# Patient Record
Sex: FEMALE | Race: WHITE | Employment: OTHER | URBAN - METROPOLITAN AREA
[De-identification: names, ages, dates, MRNs, and addresses within clinical notes are randomized per-mention and may not be internally consistent; named-entity substitution may affect disease eponyms.]

---

## 2017-01-18 ENCOUNTER — PATIENT MESSAGE (OUTPATIENT)
Dept: OBGYN CLINIC | Facility: CLINIC | Age: 32
End: 2017-01-18

## 2017-01-19 NOTE — TELEPHONE ENCOUNTER
From: Padma Suarez  To: Amirah Pineda MD  Sent: 1/18/2017 8:53 PM CST  Subject: Non-Urgent Medical Question    Hi, I had a question about getting pregnant. I was in last year in Feb due to miscarrying.  In May, i found out I had gallstones and my

## 2017-01-19 NOTE — TELEPHONE ENCOUNTER
No further recommendations  But with the gi issues women experience in pregnancy--I would get the current situation under control with the gastroenterologist.

## 2017-01-25 ENCOUNTER — LAB ENCOUNTER (OUTPATIENT)
Dept: LAB | Age: 32
End: 2017-01-25
Attending: INTERNAL MEDICINE
Payer: COMMERCIAL

## 2017-01-25 DIAGNOSIS — R17 JAUNDICE: ICD-10-CM

## 2017-01-25 LAB
ALBUMIN SERPL-MCNC: 4.3 G/DL (ref 3.5–4.8)
ALP LIVER SERPL-CCNC: 43 U/L (ref 37–98)
ALT SERPL-CCNC: 17 U/L (ref 14–54)
AST SERPL-CCNC: 13 U/L (ref 15–41)
BASOPHILS # BLD AUTO: 0.03 X10(3) UL (ref 0–0.1)
BASOPHILS NFR BLD AUTO: 0.5 %
BILIRUB DIRECT SERPL-MCNC: 0.4 MG/DL (ref 0.1–0.5)
BILIRUB SERPL-MCNC: 11.1 MG/DL (ref 0.1–2)
BUN BLD-MCNC: 12 MG/DL (ref 8–20)
CALCIUM BLD-MCNC: 8.8 MG/DL (ref 8.3–10.3)
CHLORIDE: 108 MMOL/L (ref 101–111)
CO2: 28 MMOL/L (ref 22–32)
CREAT BLD-MCNC: 0.55 MG/DL (ref 0.55–1.02)
EOSINOPHIL # BLD AUTO: 0.07 X10(3) UL (ref 0–0.3)
EOSINOPHIL NFR BLD AUTO: 1.2 %
ERYTHROCYTE [DISTWIDTH] IN BLOOD BY AUTOMATED COUNT: 19.5 % (ref 11.5–16)
GLUCOSE BLD-MCNC: 116 MG/DL (ref 70–99)
HCT VFR BLD AUTO: 29.4 % (ref 34–50)
HGB BLD-MCNC: 10.2 G/DL (ref 12–16)
IMMATURE GRANULOCYTE COUNT: 0.04 X10(3) UL (ref 0–1)
IMMATURE GRANULOCYTE RATIO %: 0.7 %
LYMPHOCYTES # BLD AUTO: 1.62 X10(3) UL (ref 0.9–4)
LYMPHOCYTES NFR BLD AUTO: 28.8 %
M PROTEIN MFR SERPL ELPH: 6.9 G/DL (ref 6.1–8.3)
MCH RBC QN AUTO: 30.4 PG (ref 27–33.2)
MCHC RBC AUTO-ENTMCNC: 34.7 G/DL (ref 31–37)
MCV RBC AUTO: 87.5 FL (ref 81–100)
MONOCYTES # BLD AUTO: 0.21 X10(3) UL (ref 0.1–0.6)
MONOCYTES NFR BLD AUTO: 3.7 %
NEUTROPHIL ABS PRELIM: 3.66 X10 (3) UL (ref 1.3–6.7)
NEUTROPHILS # BLD AUTO: 3.66 X10(3) UL (ref 1.3–6.7)
NEUTROPHILS NFR BLD AUTO: 65.1 %
PLATELET # BLD AUTO: 188 10(3)UL (ref 150–450)
POTASSIUM SERPL-SCNC: 3.9 MMOL/L (ref 3.6–5.1)
RBC # BLD AUTO: 3.36 X10(6)UL (ref 3.8–5.1)
RED CELL DISTRIBUTION WIDTH-SD: 60.4 FL (ref 35.1–46.3)
SODIUM SERPL-SCNC: 143 MMOL/L (ref 136–144)
WBC # BLD AUTO: 5.6 X10(3) UL (ref 4–13)

## 2017-01-25 PROCEDURE — 80053 COMPREHEN METABOLIC PANEL: CPT

## 2017-01-25 PROCEDURE — 36415 COLL VENOUS BLD VENIPUNCTURE: CPT

## 2017-01-25 PROCEDURE — 85025 COMPLETE CBC W/AUTO DIFF WBC: CPT

## 2017-01-25 PROCEDURE — 82248 BILIRUBIN DIRECT: CPT

## 2017-02-21 ENCOUNTER — OFFICE VISIT (OUTPATIENT)
Dept: OBGYN CLINIC | Facility: CLINIC | Age: 32
End: 2017-02-21

## 2017-02-21 ENCOUNTER — APPOINTMENT (OUTPATIENT)
Dept: LAB | Age: 32
End: 2017-02-21
Attending: FAMILY MEDICINE
Payer: COMMERCIAL

## 2017-02-21 VITALS
HEART RATE: 83 BPM | BODY MASS INDEX: 21.19 KG/M2 | WEIGHT: 135 LBS | SYSTOLIC BLOOD PRESSURE: 108 MMHG | DIASTOLIC BLOOD PRESSURE: 68 MMHG | HEIGHT: 67 IN

## 2017-02-21 DIAGNOSIS — Z34.90 EARLY STAGE OF PREGNANCY: ICD-10-CM

## 2017-02-21 DIAGNOSIS — Z87.59 HISTORY OF MISCARRIAGE: ICD-10-CM

## 2017-02-21 DIAGNOSIS — Z12.39 BREAST CANCER SCREENING: ICD-10-CM

## 2017-02-21 DIAGNOSIS — Z01.419 ENCOUNTER FOR GYNECOLOGICAL EXAMINATION WITHOUT ABNORMAL FINDING: Primary | ICD-10-CM

## 2017-02-21 DIAGNOSIS — Z12.4 CERVICAL CANCER SCREENING: ICD-10-CM

## 2017-02-21 LAB
HCG QUANTITATIVE: 335 MIU/ML (ref ?–1)
PROGESTERONE: 9.67 NG/ML

## 2017-02-21 PROCEDURE — 99395 PREV VISIT EST AGE 18-39: CPT | Performed by: OBSTETRICS & GYNECOLOGY

## 2017-02-21 PROCEDURE — 84702 CHORIONIC GONADOTROPIN TEST: CPT

## 2017-02-21 PROCEDURE — 36415 COLL VENOUS BLD VENIPUNCTURE: CPT

## 2017-02-21 PROCEDURE — 88175 CYTOPATH C/V AUTO FLUID REDO: CPT | Performed by: OBSTETRICS & GYNECOLOGY

## 2017-02-21 PROCEDURE — 84144 ASSAY OF PROGESTERONE: CPT

## 2017-02-21 PROCEDURE — 87624 HPV HI-RISK TYP POOLED RSLT: CPT | Performed by: OBSTETRICS & GYNECOLOGY

## 2017-02-21 NOTE — PROGRESS NOTES
GYN H&P     2017  11:52 AM    CC: Patient presents with:  Physical: Annual, patient states had + home upt on 17. HPI: patient is a 29year old  here for her annual gyne exam.   She has no complaints.  Pt had + pregnancy test on  Caffeine Concern No    Exercise No    Seat Belt Yes     Social History Narrative    ** Merged History Encounter **           ROS:     Review of Systems:   Constitutional: Negative for fever, chills and fatigue   HENT: Negative congestion, sore throat   Eye edema        A/P: Patient is 29year old female with no complaints. Here for well woman exam.       1. Routine gyne exam  -discussed diet and exercise  -pregnancy: h/o recent miscarriage, will go for hcg and repeat in 48 hrs  2.  Cervical cancer screening

## 2017-02-22 NOTE — PROGRESS NOTES
Quick Note:    Patient notified of labs. Recommend patient return for repeat Quantitative HCG tomorrow. Dr. Raciel Turner would like patient to use Prometrium 200mg 1 pill vaginally Q HS - RX #30 with 2 refills.  SAB precautions given.  ______

## 2017-02-23 ENCOUNTER — TELEPHONE (OUTPATIENT)
Dept: OBGYN CLINIC | Facility: CLINIC | Age: 32
End: 2017-02-23

## 2017-02-23 ENCOUNTER — APPOINTMENT (OUTPATIENT)
Dept: LAB | Age: 32
End: 2017-02-23
Attending: OBSTETRICS & GYNECOLOGY
Payer: COMMERCIAL

## 2017-02-23 DIAGNOSIS — Z32.01 PREGNANCY EXAMINATION OR TEST, POSITIVE RESULT: ICD-10-CM

## 2017-02-23 DIAGNOSIS — O20.0 THREATENED MISCARRIAGE: Primary | ICD-10-CM

## 2017-02-23 DIAGNOSIS — Z32.01 PREGNANCY EXAMINATION OR TEST, POSITIVE RESULT: Primary | ICD-10-CM

## 2017-02-23 LAB
HCG QUANTITATIVE: 739 MIU/ML (ref ?–1)
HPV I/H RISK 1 DNA SPEC QL NAA+PROBE: NEGATIVE

## 2017-02-23 PROCEDURE — 36415 COLL VENOUS BLD VENIPUNCTURE: CPT

## 2017-02-23 PROCEDURE — 84702 CHORIONIC GONADOTROPIN TEST: CPT

## 2017-02-23 NOTE — TELEPHONE ENCOUNTER
Pt c/o scant red spotting last night and one episode today. Occasional mild cramping. Pt presently asymptomatic. LMP: 1/23/16 2/21 HCG/P4 335/9.6  Rpt HCG today- pending. Disc. Pelvic rest.   Pt to call if increase bleeding or LAP.    Pt denies any

## 2017-02-23 NOTE — TELEPHONE ENCOUNTER
Pt notified. Verbalized understanding. HCG/P4 ordered. Bleeding precautions disc. Call if any questions or concerns.

## 2017-02-23 NOTE — TELEPHONE ENCOUNTER
Pt called stating she just started Progesterone  She started bleeding lightly last night  Is this normal

## 2017-02-24 NOTE — PROGRESS NOTES
Quick Note:    Patient informed. Verbalized understanding. No further questions or concerns.   ______

## 2017-02-27 ENCOUNTER — APPOINTMENT (OUTPATIENT)
Dept: LAB | Age: 32
End: 2017-02-27
Attending: OBSTETRICS & GYNECOLOGY
Payer: COMMERCIAL

## 2017-02-27 DIAGNOSIS — O20.0 THREATENED MISCARRIAGE: ICD-10-CM

## 2017-02-27 LAB
HCG QUANTITATIVE: 4221 MIU/ML (ref ?–1)
PROGESTERONE: 18.87 NG/ML

## 2017-02-27 PROCEDURE — 84702 CHORIONIC GONADOTROPIN TEST: CPT

## 2017-02-27 PROCEDURE — 36415 COLL VENOUS BLD VENIPUNCTURE: CPT

## 2017-02-27 PROCEDURE — 84144 ASSAY OF PROGESTERONE: CPT

## 2017-03-16 ENCOUNTER — OFFICE VISIT (OUTPATIENT)
Dept: OBGYN CLINIC | Facility: CLINIC | Age: 32
End: 2017-03-16

## 2017-03-16 VITALS
HEIGHT: 67 IN | WEIGHT: 134 LBS | DIASTOLIC BLOOD PRESSURE: 60 MMHG | SYSTOLIC BLOOD PRESSURE: 102 MMHG | BODY MASS INDEX: 21.03 KG/M2

## 2017-03-16 DIAGNOSIS — Z11.3 SCREENING FOR STD (SEXUALLY TRANSMITTED DISEASE): ICD-10-CM

## 2017-03-16 DIAGNOSIS — Z34.81 PRENATAL CARE, SUBSEQUENT PREGNANCY IN FIRST TRIMESTER: Primary | ICD-10-CM

## 2017-03-16 DIAGNOSIS — R11.0 NAUSEA: ICD-10-CM

## 2017-03-16 DIAGNOSIS — D58.0 HEREDITARY SPHEROCYTOSIS (HCC): ICD-10-CM

## 2017-03-16 PROCEDURE — 87491 CHLMYD TRACH DNA AMP PROBE: CPT | Performed by: OBSTETRICS & GYNECOLOGY

## 2017-03-16 PROCEDURE — 87086 URINE CULTURE/COLONY COUNT: CPT | Performed by: OBSTETRICS & GYNECOLOGY

## 2017-03-16 PROCEDURE — 87591 N.GONORRHOEAE DNA AMP PROB: CPT | Performed by: OBSTETRICS & GYNECOLOGY

## 2017-03-16 RX ORDER — PRENATAL VIT/IRON FUM/FOLIC AC 27MG-0.8MG
1 TABLET ORAL DAILY
COMMUNITY
End: 2018-01-09

## 2017-03-16 RX ORDER — DOXYLAMINE SUCCINATE AND PYRIDOXINE HYDROCHLORIDE, DELAYED RELEASE TABLETS 10 MG/10 MG 10; 10 MG/1; MG/1
2 TABLET, DELAYED RELEASE ORAL NIGHTLY
Qty: 30 TABLET | Refills: 0 | Status: SHIPPED | OUTPATIENT
Start: 2017-03-16 | End: 2017-03-28

## 2017-03-16 NOTE — PROGRESS NOTES
Here for initial prenatal visit with our group. 29year old  at 7w3d by LMP c/w todays US. H/o miscarriage last yr, borderline low progesterone this pregnancy on vaginal progesterone supplementation until 13 wks.   OB Hx: h/o , IOL at 38 wks for

## 2017-03-17 LAB
C TRACH DNA SPEC QL NAA+PROBE: POSITIVE
N GONORRHOEA DNA SPEC QL NAA+PROBE: NEGATIVE

## 2017-03-17 RX ORDER — AZITHROMYCIN 500 MG/1
TABLET, FILM COATED ORAL
Qty: 2 TABLET | Refills: 1 | Status: SHIPPED | OUTPATIENT
Start: 2017-03-17 | End: 2017-03-21

## 2017-03-17 NOTE — PROGRESS NOTES
Quick Note:    Patient informed of results. Verbalized understanding. RX sent to pharmacy. No further questions or concerns.   ______

## 2017-03-20 ENCOUNTER — LAB ENCOUNTER (OUTPATIENT)
Dept: LAB | Age: 32
End: 2017-03-20
Attending: OBSTETRICS & GYNECOLOGY
Payer: COMMERCIAL

## 2017-03-20 DIAGNOSIS — Z34.81 PRENATAL CARE, SUBSEQUENT PREGNANCY IN FIRST TRIMESTER: ICD-10-CM

## 2017-03-20 LAB
ANTIBODY SCREEN: NEGATIVE
BASOPHILS # BLD AUTO: 0.03 X10(3) UL (ref 0–0.1)
BASOPHILS NFR BLD AUTO: 0.4 %
EOSINOPHIL # BLD AUTO: 0.05 X10(3) UL (ref 0–0.3)
EOSINOPHIL NFR BLD AUTO: 0.7 %
ERYTHROCYTE [DISTWIDTH] IN BLOOD BY AUTOMATED COUNT: 21.2 % (ref 11.5–16)
HBV SURFACE AG SERPL QL IA: NONREACTIVE
HCT VFR BLD AUTO: 24.8 % (ref 34–50)
HGB BLD-MCNC: 8.7 G/DL (ref 12–16)
IMMATURE GRANULOCYTE COUNT: 0.08 X10(3) UL (ref 0–1)
IMMATURE GRANULOCYTE RATIO %: 1.1 %
LYMPHOCYTES # BLD AUTO: 1.36 X10(3) UL (ref 0.9–4)
LYMPHOCYTES NFR BLD AUTO: 18 %
MCH RBC QN AUTO: 29.8 PG (ref 27–33.2)
MCHC RBC AUTO-ENTMCNC: 35.1 G/DL (ref 31–37)
MCV RBC AUTO: 84.9 FL (ref 81–100)
MONOCYTES # BLD AUTO: 0.45 X10(3) UL (ref 0.1–0.6)
MONOCYTES NFR BLD AUTO: 6 %
NEUTROPHIL ABS PRELIM: 5.57 X10 (3) UL (ref 1.3–6.7)
NEUTROPHILS # BLD AUTO: 5.57 X10(3) UL (ref 1.3–6.7)
NEUTROPHILS NFR BLD AUTO: 73.8 %
PLATELET # BLD AUTO: 173 10(3)UL (ref 150–450)
RBC # BLD AUTO: 2.92 X10(6)UL (ref 3.8–5.1)
RED CELL DISTRIBUTION WIDTH-SD: 63.9 FL (ref 35.1–46.3)
RH BLOOD TYPE: POSITIVE
RUBELLA IGG QUANTITATIVE: 61.6 IU/ML
RUBV IGG SER QL: POSITIVE
T PALLIDUM AB SER QL IA: NONREACTIVE
WBC # BLD AUTO: 7.5 X10(3) UL (ref 4–13)

## 2017-03-20 PROCEDURE — 86780 TREPONEMA PALLIDUM: CPT

## 2017-03-20 PROCEDURE — 87340 HEPATITIS B SURFACE AG IA: CPT

## 2017-03-20 PROCEDURE — 86901 BLOOD TYPING SEROLOGIC RH(D): CPT

## 2017-03-20 PROCEDURE — 86762 RUBELLA ANTIBODY: CPT

## 2017-03-20 PROCEDURE — 86850 RBC ANTIBODY SCREEN: CPT

## 2017-03-20 PROCEDURE — 85025 COMPLETE CBC W/AUTO DIFF WBC: CPT

## 2017-03-20 PROCEDURE — 86900 BLOOD TYPING SEROLOGIC ABO: CPT

## 2017-03-20 PROCEDURE — 36415 COLL VENOUS BLD VENIPUNCTURE: CPT

## 2017-03-20 PROCEDURE — 87389 HIV-1 AG W/HIV-1&-2 AB AG IA: CPT

## 2017-03-20 RX ORDER — AZITHROMYCIN 500 MG/1
TABLET, FILM COATED ORAL
Qty: 2 TABLET | Refills: 1 | Status: CANCELLED | OUTPATIENT
Start: 2017-03-20 | End: 2017-03-24

## 2017-03-20 NOTE — TELEPHONE ENCOUNTER
Refilled for azithromycin sent to pharmacy when it was ordered 3/17/2017. Detailed message left notifying patient to contact the pharmacy for refill.

## 2017-03-20 NOTE — TELEPHONE ENCOUNTER
From: Zachary Ro  To: Frannie Arreguin MD  Sent: 3/20/2017 11:43 AM CDT  Subject: Medication Renewal Request    Original authorizing provider: MD Zachary Fofana would like a refill of the following medications:  azithromycin 500

## 2017-03-20 NOTE — PROGRESS NOTES
Quick Note:    Patient notified  She is going to call Dr. Gabe Tsang office today to schedule an appointment  ______

## 2017-03-28 RX ORDER — DOXYLAMINE SUCCINATE AND PYRIDOXINE HYDROCHLORIDE, DELAYED RELEASE TABLETS 10 MG/10 MG 10; 10 MG/1; MG/1
2 TABLET, DELAYED RELEASE ORAL NIGHTLY
Qty: 30 TABLET | Refills: 0 | Status: SHIPPED | OUTPATIENT
Start: 2017-03-28 | End: 2017-05-08

## 2017-03-28 NOTE — TELEPHONE ENCOUNTER
From: Gurmeet Ho  To: Tricia Martin MD  Sent: 3/28/2017 8:59 AM CDT  Subject: Medication Renewal Request    Original authorizing provider: MD Gurmeet Roy would like a refill of the following medications:  doxylamine-pyridox

## 2017-04-05 ENCOUNTER — TELEPHONE (OUTPATIENT)
Dept: OBGYN CLINIC | Facility: CLINIC | Age: 32
End: 2017-04-05

## 2017-04-10 ENCOUNTER — MED REC SCAN ONLY (OUTPATIENT)
Dept: OBGYN CLINIC | Facility: CLINIC | Age: 32
End: 2017-04-10

## 2017-04-13 ENCOUNTER — OFFICE VISIT (OUTPATIENT)
Dept: OBGYN CLINIC | Facility: CLINIC | Age: 32
End: 2017-04-13

## 2017-04-13 VITALS
WEIGHT: 138 LBS | BODY MASS INDEX: 21.66 KG/M2 | SYSTOLIC BLOOD PRESSURE: 100 MMHG | HEIGHT: 67 IN | DIASTOLIC BLOOD PRESSURE: 60 MMHG

## 2017-04-13 DIAGNOSIS — D58.0 HEREDITARY SPHEROCYTOSIS (HCC): ICD-10-CM

## 2017-04-13 DIAGNOSIS — Z34.81 PRENATAL CARE, SUBSEQUENT PREGNANCY IN FIRST TRIMESTER: Primary | ICD-10-CM

## 2017-04-13 RX ORDER — METOCLOPRAMIDE 10 MG/1
10 TABLET ORAL EVERY 8 HOURS PRN
Qty: 30 TABLET | Refills: 0 | Status: SHIPPED | OUTPATIENT
Start: 2017-04-13 | End: 2017-07-21 | Stop reason: ALTCHOICE

## 2017-04-13 RX ORDER — FOLIC ACID 1 MG/1
TABLET ORAL
Refills: 3 | COMMUNITY
Start: 2017-03-23 | End: 2018-01-09

## 2017-04-13 NOTE — PROGRESS NOTES
[facility]  Doing well  No complaints. No LOF/VB/uctx  RH pos  Genetic testing pt still considering (first, quad/AFP)  Anatomy Scan Level 2 w/ MFM (18-20weeks)  Referral to [facility] for hereditary spherocytosis.  Pt has hematologist. Watching hgb for now, mahnaz hays

## 2017-05-08 ENCOUNTER — OFFICE VISIT (OUTPATIENT)
Dept: OBGYN CLINIC | Facility: CLINIC | Age: 32
End: 2017-05-08

## 2017-05-08 VITALS
WEIGHT: 142 LBS | DIASTOLIC BLOOD PRESSURE: 56 MMHG | HEIGHT: 67 IN | BODY MASS INDEX: 22.29 KG/M2 | SYSTOLIC BLOOD PRESSURE: 98 MMHG

## 2017-05-08 DIAGNOSIS — D58.0 HEREDITARY SPHEROCYTOSIS (HCC): ICD-10-CM

## 2017-05-08 DIAGNOSIS — Z34.82 PRENATAL CARE, SUBSEQUENT PREGNANCY, SECOND TRIMESTER: ICD-10-CM

## 2017-05-08 DIAGNOSIS — Z3A.15 15 WEEKS GESTATION OF PREGNANCY: Primary | ICD-10-CM

## 2017-05-08 NOTE — PROGRESS NOTES
Has level 2 scheduled 6/12/17  Has heme appt scheduled --states he checks cbc  No complaints  Denies genetic testing  rtc 4 wks

## 2017-05-08 NOTE — PATIENT INSTRUCTIONS
Medications Safe in Pregnancy  The following over-the-counter medications may be taken safely after 12 weeks gestation by any patient who is pregnant. Please follow the instructions on the package for adult dosage.   If you experience any symptoms rachelle

## 2017-05-26 ENCOUNTER — TELEPHONE (OUTPATIENT)
Dept: OBGYN CLINIC | Facility: CLINIC | Age: 32
End: 2017-05-26

## 2017-05-26 ENCOUNTER — OFFICE VISIT (OUTPATIENT)
Dept: OBGYN CLINIC | Facility: CLINIC | Age: 32
End: 2017-05-26

## 2017-05-26 VITALS
DIASTOLIC BLOOD PRESSURE: 68 MMHG | WEIGHT: 142 LBS | HEIGHT: 67 IN | BODY MASS INDEX: 22.29 KG/M2 | SYSTOLIC BLOOD PRESSURE: 128 MMHG

## 2017-05-26 DIAGNOSIS — O26.892 PELVIC PAIN AFFECTING PREGNANCY IN SECOND TRIMESTER, ANTEPARTUM: Primary | ICD-10-CM

## 2017-05-26 DIAGNOSIS — R10.2 PELVIC PAIN AFFECTING PREGNANCY IN SECOND TRIMESTER, ANTEPARTUM: Primary | ICD-10-CM

## 2017-05-26 PROCEDURE — 87086 URINE CULTURE/COLONY COUNT: CPT | Performed by: NURSE PRACTITIONER

## 2017-05-26 PROCEDURE — 87660 TRICHOMONAS VAGIN DIR PROBE: CPT | Performed by: NURSE PRACTITIONER

## 2017-05-26 PROCEDURE — 99213 OFFICE O/P EST LOW 20 MIN: CPT | Performed by: NURSE PRACTITIONER

## 2017-05-26 PROCEDURE — 87510 GARDNER VAG DNA DIR PROBE: CPT | Performed by: NURSE PRACTITIONER

## 2017-05-26 PROCEDURE — 87480 CANDIDA DNA DIR PROBE: CPT | Performed by: NURSE PRACTITIONER

## 2017-05-26 NOTE — TELEPHONE ENCOUNTER
Patient scheduled for today at 230. Arias Clarity notified. Verbalized understanding. No further questions or concerns.

## 2017-05-26 NOTE — PROGRESS NOTES
Patient here c/o low bilateral cramping. It was intermittent yesterday but became constant later in the evening. Patient states pain is sharp and points to bilateral lower pelvis near hips.   Denies cramping or ROM  Slight increase in discharge, no odor, i

## 2017-05-26 NOTE — TELEPHONE ENCOUNTER
Patient wrote my chart message regarding sharp pains in abdomen. Per Terrie Fonseca patient needs to be seen. Attempted to contact patient but mailbox was full. Please schedule patient with Terrei Fonseca if she returns call for today.

## 2017-05-30 ENCOUNTER — TELEPHONE (OUTPATIENT)
Dept: OBGYN CLINIC | Facility: CLINIC | Age: 32
End: 2017-05-30

## 2017-05-30 RX ORDER — CLINDAMYCIN PHOSPHATE 20 MG/G
1 CREAM VAGINAL NIGHTLY
Qty: 40 G | Refills: 0 | Status: SHIPPED | OUTPATIENT
Start: 2017-05-30 | End: 2017-06-06

## 2017-05-30 NOTE — PROGRESS NOTES
Quick Note:    Please notify patient her Chlamydia is negative as well as her urine culture. She did test positive for BV which is necessary to treat in [facility]. Recommend abstinence for 1 week.  I will RX Clindamycin Vaginal Cream 1 applicator at bedtime

## 2017-06-06 ENCOUNTER — OFFICE VISIT (OUTPATIENT)
Dept: OBGYN CLINIC | Facility: CLINIC | Age: 32
End: 2017-06-06

## 2017-06-06 VITALS
SYSTOLIC BLOOD PRESSURE: 102 MMHG | BODY MASS INDEX: 22.13 KG/M2 | DIASTOLIC BLOOD PRESSURE: 60 MMHG | HEIGHT: 67 IN | WEIGHT: 141 LBS

## 2017-06-06 DIAGNOSIS — Z34.82 PRENATAL CARE, SUBSEQUENT PREGNANCY, SECOND TRIMESTER: Primary | ICD-10-CM

## 2017-06-06 NOTE — PROGRESS NOTES
Pelvic / uterine discomfort less. Just finished antibiotics yesterday. No vaginal symptoms at this time. FM for several weeks. Declines AFP / QS. Ultrasound with [facility] visit in 2 weeks.

## 2017-06-12 ENCOUNTER — OFFICE VISIT (OUTPATIENT)
Dept: PERINATAL CARE | Facility: HOSPITAL | Age: 32
End: 2017-06-12
Attending: OBSTETRICS & GYNECOLOGY
Payer: COMMERCIAL

## 2017-06-12 VITALS
HEART RATE: 86 BPM | WEIGHT: 142 LBS | SYSTOLIC BLOOD PRESSURE: 122 MMHG | DIASTOLIC BLOOD PRESSURE: 76 MMHG | BODY MASS INDEX: 22.29 KG/M2 | HEIGHT: 67 IN

## 2017-06-12 DIAGNOSIS — D58.0 HEREDITARY SPHEROCYTOSIS (HCC): Primary | ICD-10-CM

## 2017-06-12 DIAGNOSIS — O28.3 FETAL ECHOGENIC INTRACARDIAC FOCUS ON PRENATAL ULTRASOUND: ICD-10-CM

## 2017-06-12 PROCEDURE — 76811 OB US DETAILED SNGL FETUS: CPT

## 2017-06-12 PROCEDURE — 99243 OFF/OP CNSLTJ NEW/EST LOW 30: CPT

## 2017-06-12 NOTE — PROGRESS NOTES
Outpatient Maternal-Fetal Medicine Consultation    Dear Dr. Evan Doty,    Thank you for requesting ultrasound evaluation and maternal fetal medicine consultation on your patient Tony Albarado.   As you are aware she is a 29year old female with a Singleto gravid, soft, non-tender  Extremities: non-tender, no edema    HIV ANTIGEN ANTIBODY COMBO   Date Value Ref Range Status   03/20/2017 Non-Reactive Non-Reactive Final   ----------    OBSTETRIC ULTRASOUND  The patient had a level II ultrasound today which rev midline falx, cerebellum, cerebellar lobes, posterior fossa, vermis, cavum septi pellucidi. Heart: Visualized and normal appearance: four-chamber view, left outflow tract, right outflow tract, Ductal arch. Aortic arch: Normal.  Genitalia: Male fetus. aneuploidy screening or testing.     IMPRESSION:  · IUP at 20w0d  · Normal level II with isolated intracardiac echogenic focus  · Hereditary spherocytosis and anemia - followed by Dr. Logan Gustafson  · Marginal cord insertion     RECOMMENDATIONS:  · Continue care wi

## 2017-06-22 ENCOUNTER — TELEPHONE (OUTPATIENT)
Dept: OBGYN CLINIC | Facility: CLINIC | Age: 32
End: 2017-06-22

## 2017-06-22 ENCOUNTER — OFFICE VISIT (OUTPATIENT)
Dept: OBGYN CLINIC | Facility: CLINIC | Age: 32
End: 2017-06-22

## 2017-06-22 VITALS
DIASTOLIC BLOOD PRESSURE: 58 MMHG | WEIGHT: 146.81 LBS | HEIGHT: 67 IN | BODY MASS INDEX: 23.04 KG/M2 | SYSTOLIC BLOOD PRESSURE: 100 MMHG

## 2017-06-22 DIAGNOSIS — O26.892 PELVIC PAIN AFFECTING PREGNANCY IN SECOND TRIMESTER, ANTEPARTUM: Primary | ICD-10-CM

## 2017-06-22 DIAGNOSIS — R10.2 PELVIC PAIN AFFECTING PREGNANCY IN SECOND TRIMESTER, ANTEPARTUM: Primary | ICD-10-CM

## 2017-06-22 PROCEDURE — 99213 OFFICE O/P EST LOW 20 MIN: CPT | Performed by: NURSE PRACTITIONER

## 2017-06-22 PROCEDURE — 87660 TRICHOMONAS VAGIN DIR PROBE: CPT | Performed by: NURSE PRACTITIONER

## 2017-06-22 PROCEDURE — 87510 GARDNER VAG DNA DIR PROBE: CPT | Performed by: NURSE PRACTITIONER

## 2017-06-22 PROCEDURE — 87480 CANDIDA DNA DIR PROBE: CPT | Performed by: NURSE PRACTITIONER

## 2017-06-22 PROCEDURE — 87086 URINE CULTURE/COLONY COUNT: CPT | Performed by: NURSE PRACTITIONER

## 2017-06-22 NOTE — PROGRESS NOTES
Patient with complaints of pelvic cramping and pain since yesterday. Feels hydrated, denies UTI sx., constipation, bleeding, ROM, or vaginal symptoms. Was treated for BV a few weeks ago.   Vagina pink without lesions  Cervix without lesions, closed/ thick

## 2017-06-22 NOTE — TELEPHONE ENCOUNTER
Pt reports constant pelvic cramping since yesterday; pelvic pressure. Pt denies bleeding or LOF. Pt given appt today for evaluation.

## 2017-06-23 ENCOUNTER — TELEPHONE (OUTPATIENT)
Dept: OBGYN CLINIC | Facility: CLINIC | Age: 32
End: 2017-06-23

## 2017-06-23 NOTE — TELEPHONE ENCOUNTER
Patient informed of normal vaginal culture. Urine culture is still in process. Informed patient we will call back Monday w/ urine result.

## 2017-06-23 NOTE — TELEPHONE ENCOUNTER
Pt was told her test results would be in today and she could call to get them.   Pt would like a call back for results please

## 2017-06-26 NOTE — TELEPHONE ENCOUNTER
Patient informed of normal urine results. Verbalized understanding. To call w/ worsening pain, ROM, or vaginal bleeding. No further questions or concerns @ this time.

## 2017-07-03 ENCOUNTER — TELEPHONE (OUTPATIENT)
Dept: OBGYN CLINIC | Facility: CLINIC | Age: 32
End: 2017-07-03

## 2017-07-10 ENCOUNTER — MED REC SCAN ONLY (OUTPATIENT)
Dept: OBGYN CLINIC | Facility: CLINIC | Age: 32
End: 2017-07-10

## 2017-07-21 ENCOUNTER — OFFICE VISIT (OUTPATIENT)
Dept: OBGYN CLINIC | Facility: CLINIC | Age: 32
End: 2017-07-21

## 2017-07-21 VITALS
SYSTOLIC BLOOD PRESSURE: 100 MMHG | HEIGHT: 67 IN | BODY MASS INDEX: 24.17 KG/M2 | DIASTOLIC BLOOD PRESSURE: 56 MMHG | WEIGHT: 154 LBS

## 2017-07-21 DIAGNOSIS — Z34.82 PRENATAL CARE, SUBSEQUENT PREGNANCY, SECOND TRIMESTER: ICD-10-CM

## 2017-07-21 DIAGNOSIS — R31.21 ASYMPTOMATIC MICROSCOPIC HEMATURIA: ICD-10-CM

## 2017-07-21 LAB
BILIRUBIN: NEGATIVE
GLUCOSE (URINE DIPSTICK): NEGATIVE MG/DL
KETONES (URINE DIPSTICK): NEGATIVE MG/DL
MULTISTIX LOT#: NORMAL NUMERIC
NITRITE, URINE: NEGATIVE
PH, URINE: 5 (ref 4.5–8)
SPECIFIC GRAVITY: 1.02 (ref 1–1.03)
UROBILINOGEN,SEMI-QN: 0.2 MG/DL (ref 0–1.9)

## 2017-07-21 PROCEDURE — 81002 URINALYSIS NONAUTO W/O SCOPE: CPT | Performed by: OBSTETRICS & GYNECOLOGY

## 2017-07-21 PROCEDURE — 87086 URINE CULTURE/COLONY COUNT: CPT | Performed by: OBSTETRICS & GYNECOLOGY

## 2017-07-21 RX ORDER — NITROFURANTOIN 25; 75 MG/1; MG/1
100 CAPSULE ORAL 2 TIMES DAILY
Qty: 14 CAPSULE | Refills: 0 | Status: SHIPPED | OUTPATIENT
Start: 2017-07-21 | End: 2017-07-28

## 2017-07-21 NOTE — PROGRESS NOTES
No C/O, good FM  Large blood and leukocytes in urine  Discussed, treat Macrobid x 5 d, repeat urine/C &S next week  GL ordered  Has follow up scan with MFM  3 weeks

## 2017-07-27 ENCOUNTER — OFFICE VISIT (OUTPATIENT)
Dept: OBGYN CLINIC | Facility: CLINIC | Age: 32
End: 2017-07-27

## 2017-07-27 DIAGNOSIS — R82.90 ABNORMAL URINE FINDING: Primary | ICD-10-CM

## 2017-07-27 DIAGNOSIS — N39.0 URINARY TRACT INFECTION, SITE NOT SPECIFIED: ICD-10-CM

## 2017-07-27 LAB
BILIRUB UR QL STRIP.AUTO: NEGATIVE
COLOR UR AUTO: YELLOW
GLUCOSE UR STRIP.AUTO-MCNC: NEGATIVE MG/DL
KETONES UR STRIP.AUTO-MCNC: NEGATIVE MG/DL
NITRITE UR QL STRIP.AUTO: NEGATIVE
PH UR STRIP.AUTO: 6 [PH] (ref 4.5–8)
PROT UR STRIP.AUTO-MCNC: NEGATIVE MG/DL
RBC UR QL AUTO: NEGATIVE
SP GR UR STRIP.AUTO: 1.01 (ref 1–1.03)
UROBILINOGEN UR STRIP.AUTO-MCNC: 4 MG/DL

## 2017-07-27 PROCEDURE — 87086 URINE CULTURE/COLONY COUNT: CPT | Performed by: OBSTETRICS & GYNECOLOGY

## 2017-07-27 PROCEDURE — 81001 URINALYSIS AUTO W/SCOPE: CPT | Performed by: OBSTETRICS & GYNECOLOGY

## 2017-08-11 ENCOUNTER — OFFICE VISIT (OUTPATIENT)
Dept: OBGYN CLINIC | Facility: CLINIC | Age: 32
End: 2017-08-11

## 2017-08-11 ENCOUNTER — LAB ENCOUNTER (OUTPATIENT)
Dept: LAB | Age: 32
End: 2017-08-11
Attending: OBSTETRICS & GYNECOLOGY
Payer: COMMERCIAL

## 2017-08-11 VITALS
DIASTOLIC BLOOD PRESSURE: 70 MMHG | HEIGHT: 67 IN | BODY MASS INDEX: 24.48 KG/M2 | WEIGHT: 156 LBS | SYSTOLIC BLOOD PRESSURE: 102 MMHG

## 2017-08-11 DIAGNOSIS — Z34.83 PRENATAL CARE, SUBSEQUENT PREGNANCY, THIRD TRIMESTER: Primary | ICD-10-CM

## 2017-08-11 DIAGNOSIS — Z34.82 PRENATAL CARE, SUBSEQUENT PREGNANCY, SECOND TRIMESTER: ICD-10-CM

## 2017-08-11 LAB
BASOPHILS # BLD AUTO: 0.02 X10(3) UL (ref 0–0.1)
BASOPHILS NFR BLD AUTO: 0.2 %
EOSINOPHIL # BLD AUTO: 0.03 X10(3) UL (ref 0–0.3)
EOSINOPHIL NFR BLD AUTO: 0.4 %
ERYTHROCYTE [DISTWIDTH] IN BLOOD BY AUTOMATED COUNT: 21.8 % (ref 11.5–16)
GLUCOSE 1H P GLC SERPL-MCNC: 75 MG/DL
HCT VFR BLD AUTO: 26.7 % (ref 34–50)
HGB BLD-MCNC: 8.6 G/DL (ref 12–16)
IMMATURE GRANULOCYTE COUNT: 0.16 X10(3) UL (ref 0–1)
IMMATURE GRANULOCYTE RATIO %: 2 %
LYMPHOCYTES # BLD AUTO: 1.52 X10(3) UL (ref 0.9–4)
LYMPHOCYTES NFR BLD AUTO: 19 %
MCH RBC QN AUTO: 33.3 PG (ref 27–33.2)
MCHC RBC AUTO-ENTMCNC: 32.2 G/DL (ref 31–37)
MCV RBC AUTO: 103.5 FL (ref 81–100)
MONOCYTES # BLD AUTO: 0.49 X10(3) UL (ref 0.1–0.6)
MONOCYTES NFR BLD AUTO: 6.1 %
NEUTROPHIL ABS PRELIM: 5.8 X10 (3) UL (ref 1.3–6.7)
NEUTROPHILS # BLD AUTO: 5.8 X10(3) UL (ref 1.3–6.7)
NEUTROPHILS NFR BLD AUTO: 72.3 %
PLATELET # BLD AUTO: 130 10(3)UL (ref 150–450)
PLATELET MORPHOLOGY: NORMAL
RBC # BLD AUTO: 2.58 X10(6)UL (ref 3.8–5.1)
RED CELL DISTRIBUTION WIDTH-SD: 81.2 FL (ref 35.1–46.3)
WBC # BLD AUTO: 8 X10(3) UL (ref 4–13)

## 2017-08-11 PROCEDURE — 36415 COLL VENOUS BLD VENIPUNCTURE: CPT | Performed by: OBSTETRICS & GYNECOLOGY

## 2017-08-11 PROCEDURE — 85025 COMPLETE CBC W/AUTO DIFF WBC: CPT | Performed by: OBSTETRICS & GYNECOLOGY

## 2017-08-11 PROCEDURE — 82950 GLUCOSE TEST: CPT | Performed by: OBSTETRICS & GYNECOLOGY

## 2017-08-24 ENCOUNTER — OFFICE VISIT (OUTPATIENT)
Dept: OBGYN CLINIC | Facility: CLINIC | Age: 32
End: 2017-08-24

## 2017-08-24 VITALS
SYSTOLIC BLOOD PRESSURE: 122 MMHG | DIASTOLIC BLOOD PRESSURE: 72 MMHG | HEIGHT: 67 IN | BODY MASS INDEX: 25.43 KG/M2 | WEIGHT: 162 LBS

## 2017-08-24 DIAGNOSIS — R82.998 DARK URINE: ICD-10-CM

## 2017-08-24 DIAGNOSIS — Z23 NEED FOR VACCINATION: ICD-10-CM

## 2017-08-24 DIAGNOSIS — Z34.83 PRENATAL CARE, SUBSEQUENT PREGNANCY, THIRD TRIMESTER: Primary | ICD-10-CM

## 2017-08-24 LAB
APPEARANCE: CLEAR
BILIRUBIN: NEGATIVE
GLUCOSE (URINE DIPSTICK): NEGATIVE MG/DL
KETONES (URINE DIPSTICK): NEGATIVE MG/DL
MULTISTIX LOT#: NORMAL NUMERIC
NITRITE, URINE: NEGATIVE
PH, URINE: 6 (ref 4.5–8)
PROTEIN (URINE DIPSTICK): NEGATIVE MG/DL
SPECIFIC GRAVITY: 1.02 (ref 1–1.03)
UROBILINOGEN,SEMI-QN: 0.2 MG/DL (ref 0–1.9)

## 2017-08-24 PROCEDURE — 81002 URINALYSIS NONAUTO W/O SCOPE: CPT | Performed by: NURSE PRACTITIONER

## 2017-08-24 PROCEDURE — 90715 TDAP VACCINE 7 YRS/> IM: CPT | Performed by: NURSE PRACTITIONER

## 2017-08-24 PROCEDURE — 90471 IMMUNIZATION ADMIN: CPT | Performed by: NURSE PRACTITIONER

## 2017-08-24 PROCEDURE — 87086 URINE CULTURE/COLONY COUNT: CPT | Performed by: NURSE PRACTITIONER

## 2017-08-24 NOTE — PROGRESS NOTES
[facility]  Doing well,  GOOD FM  Denies VB/LOF/uctx  Still with lower abdominal pressure, heaviness. Suggested patient look for maternity belt. Urine still concentrated looking, trace blood/ nitrites.  Multiple urine cultures over the last 3 months have been neg

## 2017-08-24 NOTE — PATIENT INSTRUCTIONS
Drink  oz water daily    FETAL MOVEMENT CHART    Begin counting the baby's movements when you awake in the morning, or at approximately 9:00 a.m. Count ten separate times that the baby moves.   A movement can be either a kick, a swish, a turn or a

## 2017-08-28 ENCOUNTER — OFFICE VISIT (OUTPATIENT)
Dept: OBGYN CLINIC | Facility: CLINIC | Age: 32
End: 2017-08-28

## 2017-08-28 VITALS
HEIGHT: 67 IN | BODY MASS INDEX: 25.74 KG/M2 | WEIGHT: 164 LBS | SYSTOLIC BLOOD PRESSURE: 104 MMHG | DIASTOLIC BLOOD PRESSURE: 62 MMHG

## 2017-08-28 DIAGNOSIS — O60.03 PRETERM LABOR IN THIRD TRIMESTER WITHOUT DELIVERY: Primary | ICD-10-CM

## 2017-08-28 DIAGNOSIS — Z34.83 PRENATAL CARE, SUBSEQUENT PREGNANCY, THIRD TRIMESTER: Primary | ICD-10-CM

## 2017-08-28 DIAGNOSIS — O60.03 PRETERM LABOR IN THIRD TRIMESTER WITHOUT DELIVERY: ICD-10-CM

## 2017-08-28 LAB — FETAL FIBRINECTIN: NEGATIVE

## 2017-08-28 PROCEDURE — 82731 ASSAY OF FETAL FIBRONECTIN: CPT | Performed by: NURSE PRACTITIONER

## 2017-08-28 PROCEDURE — 87480 CANDIDA DNA DIR PROBE: CPT | Performed by: NURSE PRACTITIONER

## 2017-08-28 PROCEDURE — 59025 FETAL NON-STRESS TEST: CPT | Performed by: NURSE PRACTITIONER

## 2017-08-28 PROCEDURE — 87660 TRICHOMONAS VAGIN DIR PROBE: CPT | Performed by: NURSE PRACTITIONER

## 2017-08-28 PROCEDURE — 87510 GARDNER VAG DNA DIR PROBE: CPT | Performed by: NURSE PRACTITIONER

## 2017-08-28 NOTE — PROGRESS NOTES
Patient here for episodes of cramping and abdominal pressure/ heaviness. States it was happening every few minute since last night.    Today less intense and less frequent  Good FM  Denies ROM or bleeding  FFN and vaginitis panel done  Sterile speculum us

## 2017-08-28 NOTE — PROGRESS NOTES
NST done for patient complaints of cramping associated with tightening of abdomen.   NST Reactive, 1 episode of activity on TOCO

## 2017-08-29 NOTE — PROGRESS NOTES
Contacted patient. Reported results. She states understanding and reports that she is still having some cramping, but nothing has gotten worse.  Let her know that we will contact her when add'l results come back and advised her to contact office with any ch

## 2017-08-29 NOTE — PROGRESS NOTES
Contacted patient. Reported results. She states understanding. She reports that her cramps have been less today.  Encouraged hydration to prevent cramping from dehydration and frequent trips to the restroom to prevent her from having a full bladder for too

## 2017-09-06 ENCOUNTER — OFFICE VISIT (OUTPATIENT)
Dept: PERINATAL CARE | Facility: HOSPITAL | Age: 32
End: 2017-09-06
Attending: OBSTETRICS & GYNECOLOGY
Payer: COMMERCIAL

## 2017-09-06 VITALS
BODY MASS INDEX: 26 KG/M2 | DIASTOLIC BLOOD PRESSURE: 78 MMHG | SYSTOLIC BLOOD PRESSURE: 140 MMHG | WEIGHT: 164 LBS | HEART RATE: 95 BPM

## 2017-09-06 DIAGNOSIS — O28.3 FETAL ECHOGENIC INTRACARDIAC FOCUS ON PRENATAL ULTRASOUND: ICD-10-CM

## 2017-09-06 DIAGNOSIS — D58.0 HEREDITARY SPHEROCYTOSIS (HCC): Primary | ICD-10-CM

## 2017-09-06 PROCEDURE — 76805 OB US >/= 14 WKS SNGL FETUS: CPT

## 2017-09-06 PROCEDURE — 99213 OFFICE O/P EST LOW 20 MIN: CPT

## 2017-09-06 NOTE — PROGRESS NOTES
History: Age: 29 years.  : 3 Para: 1.  ____________________________________________________________________________  Dating:  LMP: 2017 EDC: 10/30/2017 GA by LMP: 32w2d  Current Scan on: 2017 EDC: 10/16/2017 GA by current scan: 34w2d  The an ultrasound and consultation for Katarina Ferguson regarding hereditary spherocytosis. Her prenatal records and labs were reviewed. Doing well. No complaints. Passed GTT.     /78   Pulse 95   Wt 164 lb (74.4 kg)   LMP 01/23/2017 ([employer]

## 2017-09-07 ENCOUNTER — OFFICE VISIT (OUTPATIENT)
Dept: OBGYN CLINIC | Facility: CLINIC | Age: 32
End: 2017-09-07

## 2017-09-07 VITALS
SYSTOLIC BLOOD PRESSURE: 126 MMHG | BODY MASS INDEX: 26.84 KG/M2 | DIASTOLIC BLOOD PRESSURE: 70 MMHG | HEIGHT: 67 IN | WEIGHT: 171 LBS

## 2017-09-07 DIAGNOSIS — Z34.83 PRENATAL CARE, SUBSEQUENT PREGNANCY, THIRD TRIMESTER: Primary | ICD-10-CM

## 2017-09-18 ENCOUNTER — OFFICE VISIT (OUTPATIENT)
Dept: OBGYN CLINIC | Facility: CLINIC | Age: 32
End: 2017-09-18

## 2017-09-18 VITALS
SYSTOLIC BLOOD PRESSURE: 108 MMHG | BODY MASS INDEX: 27 KG/M2 | HEIGHT: 67 IN | DIASTOLIC BLOOD PRESSURE: 70 MMHG | WEIGHT: 172 LBS

## 2017-09-18 DIAGNOSIS — Z34.83 PRENATAL CARE, SUBSEQUENT PREGNANCY, THIRD TRIMESTER: Primary | ICD-10-CM

## 2017-09-18 NOTE — PATIENT INSTRUCTIONS
[facility]  [phone]    FETAL MOVEMENT CHART    Begin counting the baby's movements when you wake in the morning, or at approximately 9:00 a.m. Count ten separate times that the baby moves.   A movement can be either a kick, a swish, 3p                        4p                        5p                        6p                           week 40     week 39     week 43        M T W T F S S M T W T F S S M T W T F S S   6a                        7a

## 2017-09-18 NOTE — PROGRESS NOTES
No C/O, good FM  SVE and GBS next visit  Breech discussed, moxa or acupuncture  Follow up growth scan in 2 weeks  1 week

## 2017-09-26 ENCOUNTER — OFFICE VISIT (OUTPATIENT)
Dept: OBGYN CLINIC | Facility: CLINIC | Age: 32
End: 2017-09-26

## 2017-09-26 VITALS
HEIGHT: 67 IN | SYSTOLIC BLOOD PRESSURE: 120 MMHG | BODY MASS INDEX: 27.47 KG/M2 | WEIGHT: 175 LBS | DIASTOLIC BLOOD PRESSURE: 78 MMHG

## 2017-09-26 DIAGNOSIS — Z34.83 PRENATAL CARE, SUBSEQUENT PREGNANCY, THIRD TRIMESTER: Primary | ICD-10-CM

## 2017-09-26 PROCEDURE — 87653 STREP B DNA AMP PROBE: CPT | Performed by: OBSTETRICS & GYNECOLOGY

## 2017-09-26 PROCEDURE — 87081 CULTURE SCREEN ONLY: CPT | Performed by: OBSTETRICS & GYNECOLOGY

## 2017-10-02 ENCOUNTER — LAB ENCOUNTER (OUTPATIENT)
Dept: LAB | Age: 32
End: 2017-10-02
Attending: OBSTETRICS & GYNECOLOGY
Payer: COMMERCIAL

## 2017-10-02 ENCOUNTER — APPOINTMENT (OUTPATIENT)
Dept: OBGYN CLINIC | Facility: CLINIC | Age: 32
End: 2017-10-02

## 2017-10-02 ENCOUNTER — OFFICE VISIT (OUTPATIENT)
Dept: OBGYN CLINIC | Facility: CLINIC | Age: 32
End: 2017-10-02

## 2017-10-02 VITALS
SYSTOLIC BLOOD PRESSURE: 128 MMHG | BODY MASS INDEX: 28.56 KG/M2 | WEIGHT: 182 LBS | DIASTOLIC BLOOD PRESSURE: 78 MMHG | HEIGHT: 67 IN

## 2017-10-02 DIAGNOSIS — Z34.93 ENCOUNTER FOR SUPERVISION OF NORMAL PREGNANCY IN THIRD TRIMESTER, UNSPECIFIED GRAVIDITY: ICD-10-CM

## 2017-10-02 DIAGNOSIS — O16.9 ELEVATED BLOOD PRESSURE AFFECTING PREGNANCY, ANTEPARTUM: Primary | ICD-10-CM

## 2017-10-02 DIAGNOSIS — O16.9 ELEVATED BLOOD PRESSURE AFFECTING PREGNANCY, ANTEPARTUM: ICD-10-CM

## 2017-10-02 PROCEDURE — 80053 COMPREHEN METABOLIC PANEL: CPT | Performed by: OBSTETRICS & GYNECOLOGY

## 2017-10-02 PROCEDURE — 76816 OB US FOLLOW-UP PER FETUS: CPT | Performed by: OBSTETRICS & GYNECOLOGY

## 2017-10-02 PROCEDURE — 36415 COLL VENOUS BLD VENIPUNCTURE: CPT | Performed by: OBSTETRICS & GYNECOLOGY

## 2017-10-02 PROCEDURE — 85025 COMPLETE CBC W/AUTO DIFF WBC: CPT | Performed by: OBSTETRICS & GYNECOLOGY

## 2017-10-02 PROCEDURE — 84550 ASSAY OF BLOOD/URIC ACID: CPT | Performed by: OBSTETRICS & GYNECOLOGY

## 2017-10-02 NOTE — PATIENT INSTRUCTIONS
Please check blood pressure at home at least 2-3 times per day   Please record blood pressure values     Return to office/hospital if you see that your blood pressure is elevated.      Elevated Blood Pressure is:    Systolic (top number) greater than or equ

## 2017-10-02 NOTE — PROGRESS NOTES
HA this weekend and HA today, has taken Tylenol extra strength today   +nausea this weekend denies emesis   [facility]  Denies LOF/VB/uctx. + mild, intermittent cramping.  +FM    Hx of pre eclampsia with IOL at 38wks   BP today 140/82, repeat 128/78; pt with BP 14

## 2017-10-04 ENCOUNTER — LAB ENCOUNTER (OUTPATIENT)
Dept: LAB | Age: 32
End: 2017-10-04
Attending: OBSTETRICS & GYNECOLOGY
Payer: COMMERCIAL

## 2017-10-04 ENCOUNTER — APPOINTMENT (OUTPATIENT)
Dept: OBGYN CLINIC | Facility: CLINIC | Age: 32
End: 2017-10-04

## 2017-10-04 ENCOUNTER — OFFICE VISIT (OUTPATIENT)
Dept: OBGYN CLINIC | Facility: CLINIC | Age: 32
End: 2017-10-04

## 2017-10-04 VITALS
SYSTOLIC BLOOD PRESSURE: 132 MMHG | HEIGHT: 67 IN | BODY MASS INDEX: 28.88 KG/M2 | DIASTOLIC BLOOD PRESSURE: 90 MMHG | WEIGHT: 184 LBS

## 2017-10-04 DIAGNOSIS — Z34.83 PRENATAL CARE, SUBSEQUENT PREGNANCY, THIRD TRIMESTER: Primary | ICD-10-CM

## 2017-10-04 DIAGNOSIS — O16.9 ELEVATED BLOOD PRESSURE AFFECTING PREGNANCY, ANTEPARTUM: ICD-10-CM

## 2017-10-04 PROCEDURE — 84156 ASSAY OF PROTEIN URINE: CPT | Performed by: OBSTETRICS & GYNECOLOGY

## 2017-10-04 PROCEDURE — 59025 FETAL NON-STRESS TEST: CPT | Performed by: OBSTETRICS & GYNECOLOGY

## 2017-10-04 NOTE — PROGRESS NOTES
Some headaches in frontal area. No scotomata. Some nausea but probably reflux in nature - takes Tums and better. Good FM. NST reactive. Reflexes normal and no clonus. Labs with normal LFT's, platelets at 530,462 and uric acid of 5.6.  Turned in 24 hour u

## 2017-10-05 ENCOUNTER — TELEPHONE (OUTPATIENT)
Dept: OBGYN CLINIC | Facility: CLINIC | Age: 32
End: 2017-10-05

## 2017-10-05 NOTE — PROGRESS NOTES
Patient seen in office on 10/05/17  2nd elevated BP and now 24 hr urine protein 489.    Patient with history of pre eclampsia and IOL at 38 wks  Patient scheduled for NST on Monday and repeat labs   Consider IOL at that time for pre eclampsia without severe

## 2017-10-06 NOTE — TELEPHONE ENCOUNTER
Patient notified of 24 hr urine protein results elevated. Patient had second elevated BP in office on 10/04/17. Patient now with diagnosis of pre eclampsia without severe features.  Discussion held with patient about recommendation for delivery via

## 2017-10-09 ENCOUNTER — APPOINTMENT (OUTPATIENT)
Dept: OBGYN CLINIC | Facility: CLINIC | Age: 32
End: 2017-10-09

## 2017-10-09 ENCOUNTER — LAB ENCOUNTER (OUTPATIENT)
Dept: LAB | Age: 32
End: 2017-10-09
Attending: OBSTETRICS & GYNECOLOGY
Payer: COMMERCIAL

## 2017-10-09 ENCOUNTER — OFFICE VISIT (OUTPATIENT)
Dept: OBGYN CLINIC | Facility: CLINIC | Age: 32
End: 2017-10-09

## 2017-10-09 VITALS
HEIGHT: 67 IN | BODY MASS INDEX: 29.19 KG/M2 | WEIGHT: 186 LBS | DIASTOLIC BLOOD PRESSURE: 72 MMHG | SYSTOLIC BLOOD PRESSURE: 126 MMHG

## 2017-10-09 DIAGNOSIS — O16.9 ELEVATED BLOOD PRESSURE AFFECTING PREGNANCY, ANTEPARTUM: ICD-10-CM

## 2017-10-09 DIAGNOSIS — O14.93 PRE-ECLAMPSIA IN THIRD TRIMESTER: Primary | ICD-10-CM

## 2017-10-09 PROCEDURE — 85025 COMPLETE CBC W/AUTO DIFF WBC: CPT | Performed by: OBSTETRICS & GYNECOLOGY

## 2017-10-09 PROCEDURE — 80053 COMPREHEN METABOLIC PANEL: CPT | Performed by: OBSTETRICS & GYNECOLOGY

## 2017-10-09 PROCEDURE — 36415 COLL VENOUS BLD VENIPUNCTURE: CPT | Performed by: OBSTETRICS & GYNECOLOGY

## 2017-10-09 NOTE — PROGRESS NOTES
[facility]  Doing well, +FM  Denies VB/LOF/uctx  Mode of delivery:   anticipated, SVE //hi. cdil 10/10, 10/11 IOL for preeclampsia.  preeclampsia precautions given    GBS neg  RTC 1 week  NST reactive

## 2017-10-10 ENCOUNTER — HOSPITAL ENCOUNTER (INPATIENT)
Dept: OBGYN CLINIC | Facility: HOSPITAL | Age: 32
Discharge: HOME | End: 2017-10-10
Payer: COMMERCIAL

## 2017-10-10 ENCOUNTER — TELEPHONE (OUTPATIENT)
Dept: OBGYN UNIT | Facility: HOSPITAL | Age: 32
End: 2017-10-10

## 2017-10-10 ENCOUNTER — HOSPITAL ENCOUNTER (INPATIENT)
Facility: HOSPITAL | Age: 32
LOS: 3 days | Discharge: HOME | End: 2017-10-13
Attending: OBSTETRICS & GYNECOLOGY | Admitting: OBSTETRICS & GYNECOLOGY
Payer: COMMERCIAL

## 2017-10-10 PROBLEM — Z34.90 PREGNANCY: Status: ACTIVE | Noted: 2017-10-10

## 2017-10-10 PROBLEM — Z34.90 PREGNANCY (HCC): Status: ACTIVE | Noted: 2017-10-10

## 2017-10-10 RX ORDER — TRISODIUM CITRATE DIHYDRATE AND CITRIC ACID MONOHYDRATE 500; 334 MG/5ML; MG/5ML
30 SOLUTION ORAL AS NEEDED
Status: DISCONTINUED | OUTPATIENT
Start: 2017-10-10 | End: 2017-10-11

## 2017-10-10 RX ORDER — IBUPROFEN 600 MG/1
600 TABLET ORAL ONCE AS NEEDED
Status: DISCONTINUED | OUTPATIENT
Start: 2017-10-10 | End: 2017-10-11

## 2017-10-10 RX ORDER — ZOLPIDEM TARTRATE 5 MG/1
5 TABLET ORAL NIGHTLY PRN
Status: DISCONTINUED | OUTPATIENT
Start: 2017-10-10 | End: 2017-10-11

## 2017-10-10 RX ORDER — TERBUTALINE SULFATE 1 MG/ML
0.25 INJECTION, SOLUTION SUBCUTANEOUS AS NEEDED
Status: DISCONTINUED | OUTPATIENT
Start: 2017-10-10 | End: 2017-10-11

## 2017-10-10 RX ORDER — SODIUM CHLORIDE, SODIUM LACTATE, POTASSIUM CHLORIDE, CALCIUM CHLORIDE 600; 310; 30; 20 MG/100ML; MG/100ML; MG/100ML; MG/100ML
INJECTION, SOLUTION INTRAVENOUS CONTINUOUS
Status: DISCONTINUED | OUTPATIENT
Start: 2017-10-10 | End: 2017-10-11

## 2017-10-10 RX ORDER — DEXTROSE, SODIUM CHLORIDE, SODIUM LACTATE, POTASSIUM CHLORIDE, AND CALCIUM CHLORIDE 5; .6; .31; .03; .02 G/100ML; G/100ML; G/100ML; G/100ML; G/100ML
INJECTION, SOLUTION INTRAVENOUS AS NEEDED
Status: DISCONTINUED | OUTPATIENT
Start: 2017-10-10 | End: 2017-10-11

## 2017-10-10 NOTE — PROGRESS NOTES
Pt is a 29year old female admitted to [facility]. Patient presents with:  Scheduled Induction: presents tonight for cervidil induction of labor     Pt is  37w1d intra-uterine pregnancy. History obtained, consents signed.  Oriented to room, staff,

## 2017-10-11 PROCEDURE — 59400 OBSTETRICAL CARE: CPT | Performed by: OBSTETRICS & GYNECOLOGY

## 2017-10-11 PROCEDURE — 3E033VJ INTRODUCTION OF OTHER HORMONE INTO PERIPHERAL VEIN, PERCUTANEOUS APPROACH: ICD-10-PCS | Performed by: OBSTETRICS & GYNECOLOGY

## 2017-10-11 RX ORDER — ONDANSETRON 2 MG/ML
INJECTION INTRAMUSCULAR; INTRAVENOUS
Status: COMPLETED
Start: 2017-10-11 | End: 2017-10-11

## 2017-10-11 RX ORDER — LABETALOL HYDROCHLORIDE 5 MG/ML
20 INJECTION, SOLUTION INTRAVENOUS ONCE
Status: COMPLETED | OUTPATIENT
Start: 2017-10-11 | End: 2017-10-11

## 2017-10-11 RX ORDER — CALCIUM GLUCONATE 94 MG/ML
1 INJECTION, SOLUTION INTRAVENOUS ONCE AS NEEDED
Status: DISCONTINUED | OUTPATIENT
Start: 2017-10-11 | End: 2017-10-11

## 2017-10-11 RX ORDER — ZOLPIDEM TARTRATE 5 MG/1
5 TABLET ORAL NIGHTLY PRN
Status: DISCONTINUED | OUTPATIENT
Start: 2017-10-11 | End: 2017-10-13

## 2017-10-11 RX ORDER — DOCUSATE SODIUM 100 MG/1
100 CAPSULE, LIQUID FILLED ORAL
Status: DISCONTINUED | OUTPATIENT
Start: 2017-10-11 | End: 2017-10-13

## 2017-10-11 RX ORDER — NALBUPHINE HCL 10 MG/ML
2.5 AMPUL (ML) INJECTION
Status: DISCONTINUED | OUTPATIENT
Start: 2017-10-11 | End: 2017-10-13

## 2017-10-11 RX ORDER — ONDANSETRON 2 MG/ML
4 INJECTION INTRAMUSCULAR; INTRAVENOUS ONCE
Status: COMPLETED | OUTPATIENT
Start: 2017-10-11 | End: 2017-10-11

## 2017-10-11 RX ORDER — SIMETHICONE 80 MG
80 TABLET,CHEWABLE ORAL 3 TIMES DAILY PRN
Status: DISCONTINUED | OUTPATIENT
Start: 2017-10-11 | End: 2017-10-13

## 2017-10-11 RX ORDER — HYDROCODONE BITARTRATE AND ACETAMINOPHEN 5; 325 MG/1; MG/1
1 TABLET ORAL EVERY 4 HOURS PRN
Status: DISCONTINUED | OUTPATIENT
Start: 2017-10-11 | End: 2017-10-13

## 2017-10-11 RX ORDER — POTASSIUM CHLORIDE 20 MEQ/1
40 TABLET, EXTENDED RELEASE ORAL EVERY 4 HOURS
Status: COMPLETED | OUTPATIENT
Start: 2017-10-11 | End: 2017-10-11

## 2017-10-11 RX ORDER — BISACODYL 10 MG
10 SUPPOSITORY, RECTAL RECTAL ONCE AS NEEDED
Status: ACTIVE | OUTPATIENT
Start: 2017-10-11 | End: 2017-10-11

## 2017-10-11 RX ORDER — HYDROCODONE BITARTRATE AND ACETAMINOPHEN 5; 325 MG/1; MG/1
2 TABLET ORAL EVERY 4 HOURS PRN
Status: DISCONTINUED | OUTPATIENT
Start: 2017-10-11 | End: 2017-10-13

## 2017-10-11 RX ORDER — IBUPROFEN 600 MG/1
600 TABLET ORAL EVERY 6 HOURS
Status: DISCONTINUED | OUTPATIENT
Start: 2017-10-11 | End: 2017-10-13

## 2017-10-11 RX ORDER — EPHEDRINE SULFATE 50 MG/ML
5 INJECTION, SOLUTION INTRAVENOUS AS NEEDED
Status: DISCONTINUED | OUTPATIENT
Start: 2017-10-11 | End: 2017-10-11

## 2017-10-11 RX ORDER — ACETAMINOPHEN 325 MG/1
650 TABLET ORAL EVERY 4 HOURS PRN
Status: DISCONTINUED | OUTPATIENT
Start: 2017-10-11 | End: 2017-10-13

## 2017-10-11 RX ORDER — HYDROMORPHONE HYDROCHLORIDE 1 MG/ML
1 INJECTION, SOLUTION INTRAMUSCULAR; INTRAVENOUS; SUBCUTANEOUS
Status: DISCONTINUED | OUTPATIENT
Start: 2017-10-11 | End: 2017-10-13

## 2017-10-11 RX ORDER — LABETALOL HYDROCHLORIDE 5 MG/ML
INJECTION, SOLUTION INTRAVENOUS
Status: COMPLETED
Start: 2017-10-11 | End: 2017-10-11

## 2017-10-11 NOTE — H&P
[facility]  History & Physical    Leandrew Art Patient Status:  Inpatient    1985 MRN [id number]   Location [address] Attending Edin Aguirre MD    1 PCP Jaida Pruitt MD     CC: patient is here for IOL Procedure: LAPAROSCOPIC CHOLECYSTECTOMY;                 Surgeon: Karen Fernandez MD;  Location: [address] [place] [address]                MAIN OR  Family History: No family history on file.   Social History:    Smoking status: Former Smoker     Quit date: 10/21/2016    Sondra discussed in detail with the patient. Pre-admission, admission, and post admission procedures and expectations were discussed in detail. All questions answered, all appropriate consents will be signed at the Hospital. Admission is planned for today.  KASANDRA

## 2017-10-11 NOTE — PROGRESS NOTES
OB PN  SVE 2-3/70/-2. AROM clr fluid  Preeclampsia with severe features. plt down to 92 from 132. .    Magnesium sulfate per protocol  Epidural upon request    Mikal De Leon MD

## 2017-10-11 NOTE — PROGRESS NOTES
Report received from [facility]. Patient resting comfortably in bed. POC discussed with patient. All questions answered. Patient verbalized understanding. Call light within reach.

## 2017-10-11 NOTE — L&D DELIVERY NOTE
Annel Castano  [[id number]]     Labor Events     labor?:  No    steroids?:  None   Cervical ripening date/time:  10/10/17 1852   Cervical ripening type:  Cervidil   Antibiotics received during labor?:  No   Rupture date:  10/11/17   Rupture Date/time:  10/11/2017 1535   Removal:  Spontaneous   Disposition:  held for future pathology          Apgars    Living status:  Living   Apgar Scoring Key:     0 1 2    Skin color Blue or pale Acrocyanotic Completely pink    Heart rate Absent <100 bpm > cervical dilation spontaneously, with pitocin augmentation. Findings:    Sex: male     FLBXFY:7792 g      Apgars: 9/9      Lacerations: left mediolateral episiotomy   left periurethral, hemostatic       Procedure:   The patients was placed in the dors

## 2017-10-11 NOTE — PROGRESS NOTES
Pt transferred via bed in stable condition with infant in [facility]. Accompanied by staff and . All belongings accounted for. Infant assessment done by Jony Parsons RN.

## 2017-10-11 NOTE — PROGRESS NOTES
Increased maternal jaundice noted beyond baseline. Dr. Mancia Certain notified. New order received for repeat cbc and cmp.

## 2017-10-12 RX ORDER — SODIUM CHLORIDE, SODIUM LACTATE, POTASSIUM CHLORIDE, CALCIUM CHLORIDE 600; 310; 30; 20 MG/100ML; MG/100ML; MG/100ML; MG/100ML
INJECTION, SOLUTION INTRAVENOUS CONTINUOUS
Status: DISCONTINUED | OUTPATIENT
Start: 2017-10-12 | End: 2017-10-13

## 2017-10-12 NOTE — PAYOR COMM NOTE
--------------  CONTINUED STAY REVIEW    Payor: [employer] PPO  Subscriber #:  [id number]  Authorization Number: [id number]    Admit date: 10/10/17  Admit time: 1    Admitting Physician: Philly Butler MD  Attending Physician:  Clance Opitz spontaneosly by uterine massage and sent to pathology.     The left mediolateral episiotomy degree repaired in layers in the usual fashion with  2.0 vicryl rapide.   Rectal-vaginal exam was normal.     Bleeding was minimal.  The patient was then moved to  Siobhan Carpio RN    10/11/2017 1117 New Bag (none) Intravenous Parviz Dodd RN      lactated ringers infusion     Date Action Dose Route User    10/12/2017 8947 New Bag (none) Intravenous Yared Ashford RN      magnesium sulfate 40mg/ml infusion     Date A

## 2017-10-12 NOTE — PROGRESS NOTES
MgSO4 off for about one hour and BP's acceptable. Tolerated general diet. No symptoms. Okay to transfer to postpartum. BP's Q 4 hours. Repeat labs in am - platelets stable and LFT's improving.

## 2017-10-12 NOTE — PAYOR COMM NOTE
--------------  ADMISSION REVIEW     Payor: [employer]ly [employer]er [employer] PPO  Subscriber #:  [id number]  Authorization Number: [id number]    Admit date: 10/10/17  Admit time: [address]       Admitting Physician: Colletta Muster, MD  Attending Physician:  Angely Jones History:[DC.1]   OB History    Para Term  AB Living   3 1 1 0 1 1   SAB TAB Ectopic Multiple Live Births   1 0 0 0 1      # Outcome Date GA Lbr Naseem/2nd Weight Sex Delivery Anes PTL Lv   3 Current            2 2016 5w0d          1 Term 12/ 1/50/-2 per RN   [id number]    Lab Review:  GBS negative[DC.1]    Lab Results  Component Value Date   WBC 11.4 10/10/2017   HGB 9.6 10/10/2017   HCT 27.6 10/10/2017   .0 10/10/2017   CREATSERUM 0.45 10/10/2017   BUN 7 10/10/2017    10/10/2017 injection 1 mg     Date Action Dose Route User    10/11/2017 0948 Given 1 mg Intravenous Carla Combs RN      ibuprofen (MOTRIN) tab 600 mg     Date Action Dose Route User    10/11/2017 1704 Given 600 mg Oral Gerilyn MANNY Combs      ibuprofen (MOTRIN) RN    10/11/2017 1049 Rate/Dose Change 12 aissatou-units/min Intravenous Monica Yancey RN    10/11/2017 1000 Rate/Dose Change 10 aissatou-units/min Intravenous Monica Yancey RN    10/11/2017 0945 Rate/Dose Change 8 aissatou-units/min Intravenous Sunshine Raines

## 2017-10-12 NOTE — PROGRESS NOTES
[facility] Patient resting comfortably in bed. POC discussed with patient. All questions answered. Patient verbalized understanding. Call light within reach.

## 2017-10-12 NOTE — PLAN OF CARE
Problem: SAFETY ADULT - FALL  Goal: Free from fall injury  INTERVENTIONS:  - Assess pt frequently for physical needs  - Identify cognitive and physical deficits and behaviors that affect risk of falls.   - [place] fall precautions as indicated by assessme feeding.  - Provide information as needed about early infant feeding cues (e.g., rooting, lip smacking, sucking fingers/hand) versus late cue of crying.  - Discuss/demonstrate breast feeding aids (e.g., infant sling, nursing footstool/pillows, and breast p Progressing

## 2017-10-13 VITALS
BODY MASS INDEX: 29.19 KG/M2 | TEMPERATURE: 98 F | WEIGHT: 186 LBS | RESPIRATION RATE: 18 BRPM | OXYGEN SATURATION: 99 % | HEART RATE: 85 BPM | HEIGHT: 67 IN | SYSTOLIC BLOOD PRESSURE: 145 MMHG | DIASTOLIC BLOOD PRESSURE: 92 MMHG

## 2017-10-13 PROBLEM — Z34.90 PREGNANCY: Status: RESOLVED | Noted: 2017-10-10 | Resolved: 2017-10-13

## 2017-10-13 PROBLEM — Z34.90 PREGNANCY (HCC): Status: RESOLVED | Noted: 2017-10-10 | Resolved: 2017-10-13

## 2017-10-13 RX ORDER — LABETALOL 200 MG/1
200 TABLET, FILM COATED ORAL EVERY 12 HOURS SCHEDULED
Status: DISCONTINUED | OUTPATIENT
Start: 2017-10-13 | End: 2017-10-13

## 2017-10-13 RX ORDER — LABETALOL 200 MG/1
200 TABLET, FILM COATED ORAL EVERY 12 HOURS SCHEDULED
Qty: 60 TABLET | Refills: 0 | Status: SHIPPED | OUTPATIENT
Start: 2017-10-13 | End: 2018-01-09

## 2017-10-13 RX ORDER — PSEUDOEPHEDRINE HCL 30 MG
100 TABLET ORAL 2 TIMES DAILY PRN
Qty: 60 CAPSULE | Refills: 0 | Status: SHIPPED | OUTPATIENT
Start: 2017-10-13 | End: 2018-01-09

## 2017-10-13 RX ORDER — IBUPROFEN 600 MG/1
600 TABLET ORAL EVERY 6 HOURS PRN
Qty: 30 TABLET | Refills: 0 | Status: SHIPPED | OUTPATIENT
Start: 2017-10-13 | End: 2018-01-09

## 2017-10-13 RX ORDER — FERROUS SULFATE 325(65) MG
325 TABLET ORAL 2 TIMES DAILY
Qty: 60 TABLET | Refills: 0 | Status: SHIPPED | OUTPATIENT
Start: 2017-10-13 | End: 2018-03-27

## 2017-10-13 NOTE — PLAN OF CARE
POSTPARTUM    • Experiences normal breast weaning course Completed        SAFETY ADULT - FALL    • Free from fall injury Completed          POSTPARTUM    • Long Term Goal:Experiences normal postpartum course Progressing    • Establishment of adequate milk

## 2017-10-13 NOTE — DISCHARGE SUMMARY
[facility]  Discharge Summary    Gail Kayser Patient Status:  Inpatient    1985 MRN [id number]   [facility] 2SW-J Attending Chen Hughes MD   [facility] Day # 3 PCP Ammon Proctor MD     Date of Admission: 10/10/2017    D difficulty. Labetalol 200 mg PO BID was initiated and -140s/80-90s following first dose. The patient requested to return to home. Patient advised to continue home BP monitoring and BP medication. Follow up in office in 3 days.      Procedures:

## 2017-10-13 NOTE — CM/SW NOTE
CM met with patient and reviewed insurance and PCP for infant with patient. Patient, Dwayne Armando, stated that infant would be added to the [employer] PPO and [employer] plan and that PCP will be Dr Dirk Hernandez with [facility] at [phone].  Vinay

## 2017-10-13 NOTE — PROGRESS NOTES
[facility]  Obstetrics and Gynecology    OB/GYN: Postpartum Progress Note     SUBJECTIVE:  Patient is a 29year old  female who is s/p . She is PPD# 2. Doing well.  Denies fever, chills, N, V, Ha, vision changes, RUQ/epigastric pain c s/p  PPD# 2.      Doing well   Continue routine postpartum care  Vitals per routine   Encourage ambulation   Anemia: Hgb 9.4, iron supplementation BID   Pre Eclampsia with severe features: s/p magnesium sulfate for 24 hours, -144/77-92, denies sym

## 2017-10-17 ENCOUNTER — TELEPHONE (OUTPATIENT)
Dept: OBGYN UNIT | Facility: HOSPITAL | Age: 32
End: 2017-10-17

## 2017-10-17 ENCOUNTER — OFFICE VISIT (OUTPATIENT)
Dept: OBGYN CLINIC | Facility: CLINIC | Age: 32
End: 2017-10-17

## 2017-10-17 VITALS — SYSTOLIC BLOOD PRESSURE: 160 MMHG | DIASTOLIC BLOOD PRESSURE: 90 MMHG

## 2017-10-17 DIAGNOSIS — O14.93 PRE-ECLAMPSIA IN THIRD TRIMESTER: Primary | ICD-10-CM

## 2017-10-17 NOTE — PROGRESS NOTES
/90, pulse 80  No c/o  BP at home lower, 130-140/70-90  Infant still in NICU  Take BP at home   drop by office for BP check  Increase labetalol to 300mg bid

## 2017-10-17 NOTE — PROGRESS NOTES
Reviewed self care w / mom, infant remains in nicu for phototx she verbalizes understanding of instructions reviewed. Encourage to follow up w/ MDs as directed and w/ questions/concerns. All PIH sx reviewed and pt denies at this time.

## 2017-10-31 ENCOUNTER — TELEPHONE (OUTPATIENT)
Dept: OBGYN CLINIC | Facility: CLINIC | Age: 32
End: 2017-10-31

## 2017-10-31 NOTE — TELEPHONE ENCOUNTER
Received Hematology/Oncology Office Notes    Put in Dr. Ariana obregon in [address] [place] [address]

## 2017-11-07 ENCOUNTER — MED REC SCAN ONLY (OUTPATIENT)
Dept: OBGYN CLINIC | Facility: CLINIC | Age: 32
End: 2017-11-07

## 2017-11-21 ENCOUNTER — OFFICE VISIT (OUTPATIENT)
Dept: OBGYN CLINIC | Facility: CLINIC | Age: 32
End: 2017-11-21

## 2017-11-21 ENCOUNTER — LAB ENCOUNTER (OUTPATIENT)
Dept: LAB | Age: 32
End: 2017-11-21
Attending: OBSTETRICS & GYNECOLOGY
Payer: COMMERCIAL

## 2017-11-21 VITALS
SYSTOLIC BLOOD PRESSURE: 110 MMHG | BODY MASS INDEX: 22 KG/M2 | RESPIRATION RATE: 16 BRPM | HEART RATE: 77 BPM | TEMPERATURE: 99 F | DIASTOLIC BLOOD PRESSURE: 66 MMHG | WEIGHT: 141 LBS

## 2017-11-21 DIAGNOSIS — D69.6 THROMBOCYTOPENIA (HCC): ICD-10-CM

## 2017-11-21 DIAGNOSIS — R74.8 ELEVATED LIVER ENZYMES: ICD-10-CM

## 2017-11-21 DIAGNOSIS — R74.8 ELEVATED LIVER ENZYMES: Primary | ICD-10-CM

## 2017-11-21 PROCEDURE — 80053 COMPREHEN METABOLIC PANEL: CPT | Performed by: OBSTETRICS & GYNECOLOGY

## 2017-11-21 PROCEDURE — 36415 COLL VENOUS BLD VENIPUNCTURE: CPT | Performed by: OBSTETRICS & GYNECOLOGY

## 2017-11-21 PROCEDURE — 85025 COMPLETE CBC W/AUTO DIFF WBC: CPT | Performed by: OBSTETRICS & GYNECOLOGY

## 2017-11-21 NOTE — PROGRESS NOTES
GYNE postpartum note     S: patient is a 29year old yo   female who presents today for post partum visit. She underwent  on  complicated by preeclampsia w/ severe features. She is doing well, not breast feeding. Has no complaints.  Penelope

## 2018-01-07 ENCOUNTER — NURSE ONLY (OUTPATIENT)
Dept: FAMILY MEDICINE CLINIC | Facility: CLINIC | Age: 33
End: 2018-01-07

## 2018-01-07 VITALS
HEART RATE: 87 BPM | BODY MASS INDEX: 22.18 KG/M2 | OXYGEN SATURATION: 99 % | HEIGHT: 66 IN | DIASTOLIC BLOOD PRESSURE: 62 MMHG | WEIGHT: 138 LBS | RESPIRATION RATE: 20 BRPM | TEMPERATURE: 98 F | SYSTOLIC BLOOD PRESSURE: 110 MMHG

## 2018-01-07 DIAGNOSIS — H65.191 OTHER ACUTE NONSUPPURATIVE OTITIS MEDIA OF RIGHT EAR, RECURRENCE NOT SPECIFIED: Primary | ICD-10-CM

## 2018-01-07 PROCEDURE — 99213 OFFICE O/P EST LOW 20 MIN: CPT | Performed by: PHYSICIAN ASSISTANT

## 2018-01-07 RX ORDER — AMOXICILLIN 875 MG/1
875 TABLET, COATED ORAL 2 TIMES DAILY
Qty: 20 TABLET | Refills: 0 | Status: SHIPPED | OUTPATIENT
Start: 2018-01-07 | End: 2018-01-17

## 2018-01-07 NOTE — PATIENT INSTRUCTIONS
Middle Ear Infection (Adult)  You have an infection of the middle ear, the space behind the eardrum. This is also called acute otitis media (AOM). Sometimes it is caused by the common cold.  This is because congestion can block the internal passage (eusta The middle ear is the space behind the eardrum. You have an infection of the middle ear. This can lead to pressure that causes the eardrum to break (rupture). This may cause sudden pain.  Pus or blood will drain out of the ear canal. Your hearing will also

## 2018-01-07 NOTE — PROGRESS NOTES
CHIEF COMPLAINT:   Patient presents with:  Flu: fluid from ear,sinus pressure,coughing and fever x 3 days      HPI:   Tiago Massey is a 29year old female who presents to clinic today with complaints of L ear pain. Has had for 3  days.  Pain is desc Smokeless tobacco: Never Used                      Alcohol use: No               Comment: social       REVIEW OF SYSTEMS:   GENERAL: feels well   SKIN: no unusual skin lesions or rashes  HEENT: See HPI  LUNGS: No cough, shortness of breath, or wheezing.   C amoxicillin 875 MG Oral Tab 20 tablet 0      Sig: Take 1 tablet (875 mg total) by mouth 2 (two) times daily. Risk and benefits of medication discussed. Stressed importance of completing full course of antibiotic. Tylenol/Motrin prn pain. · Fluid or blood draining from the ear canal  · Fever of 100.4°F (38°C) or as advised   · Seizure  Date Last Reviewed: 6/1/2016  © 1951-0428 [employer]. [address] [facility], [address] [place] [address]. All rights reserved.  This information is not Date Last Reviewed: 5/3/2015  © 9604-8336 [employer]. [address] [facility], [facility] [place]. All rights reserved. This information is not intended as a substitute for professional medical care.  Always follow your healthcare professional'

## 2018-01-09 ENCOUNTER — OFFICE VISIT (OUTPATIENT)
Dept: FAMILY MEDICINE CLINIC | Facility: CLINIC | Age: 33
End: 2018-01-09

## 2018-01-09 ENCOUNTER — PATIENT MESSAGE (OUTPATIENT)
Dept: FAMILY MEDICINE CLINIC | Facility: CLINIC | Age: 33
End: 2018-01-09

## 2018-01-09 VITALS
RESPIRATION RATE: 20 BRPM | HEIGHT: 66 IN | HEART RATE: 80 BPM | BODY MASS INDEX: 22.82 KG/M2 | WEIGHT: 142 LBS | SYSTOLIC BLOOD PRESSURE: 122 MMHG | TEMPERATURE: 99 F | OXYGEN SATURATION: 98 % | DIASTOLIC BLOOD PRESSURE: 72 MMHG

## 2018-01-09 DIAGNOSIS — H65.02 ACUTE SEROUS OTITIS MEDIA OF LEFT EAR, RECURRENCE NOT SPECIFIED: Primary | ICD-10-CM

## 2018-01-09 PROCEDURE — 99213 OFFICE O/P EST LOW 20 MIN: CPT | Performed by: NURSE PRACTITIONER

## 2018-01-09 RX ORDER — CIPROFLOXACIN AND DEXAMETHASONE 3; 1 MG/ML; MG/ML
4 SUSPENSION/ DROPS AURICULAR (OTIC) 2 TIMES DAILY
Qty: 1 BOTTLE | Refills: 0 | Status: SHIPPED | OUTPATIENT
Start: 2018-01-09 | End: 2018-01-16

## 2018-01-09 RX ORDER — AMOXICILLIN AND CLAVULANATE POTASSIUM 875; 125 MG/1; MG/1
1 TABLET, FILM COATED ORAL 2 TIMES DAILY
Qty: 20 TABLET | Refills: 0 | Status: SHIPPED | OUTPATIENT
Start: 2018-01-09 | End: 2018-01-19

## 2018-01-09 RX ORDER — FLUTICASONE PROPIONATE 50 MCG
2 SPRAY, SUSPENSION (ML) NASAL DAILY
Qty: 1 BOTTLE | Refills: 0 | Status: SHIPPED | OUTPATIENT
Start: 2018-01-09 | End: 2018-03-27

## 2018-01-09 NOTE — TELEPHONE ENCOUNTER
From: Rudy Maier  To: Kaylin Ireland MD  Sent: 1/9/2018 7:47 AM CST  Subject: Non-Urgent Medical Question    Hi! I went to walk in clinic Sunday for an ear infection. I started amoxicillin that day. I still am not feeling any different.  Both ear

## 2018-01-09 NOTE — PROGRESS NOTES
CHIEF COMPLAINT:   Patient presents with:  Ear Pain: Left ear for 4 days      HPI:   Hay Pabon is a 29year old female who presents to clinic today with complaints of left ear pain. Has had for 4  days. Pain is described as sharp, constant.   P Quit date: 10/21/2016  Smokeless tobacco: Never Used                      Alcohol use: No               Comment: social       REVIEW OF SYSTEMS:   GENERAL: Feeling well otherwise.     SKIN: no unusual skin lesions or rashes  HEENT: See HPI  LUNGS: No cou Fluticasone Propionate 50 MCG/ACT Nasal Suspension 1 Bottle 0      Si sprays by Each Nare route daily. Amoxicillin-Pot Clavulanate 875-125 MG Oral Tab 20 tablet 0      Sig: Take 1 tablet by mouth 2 (two) times daily.              Risk and benefits · Ear pain gets worse or does not improve after 3 days of treatment  · Unusual drowsiness or confusion  · Neck pain, stiff neck, or headache  · Fluid or blood draining from the ear canal  · Fever of 100.4°F (38°C) or as advised   · Seizure  Date Last Revie

## 2018-01-17 ENCOUNTER — PATIENT MESSAGE (OUTPATIENT)
Dept: FAMILY MEDICINE CLINIC | Facility: CLINIC | Age: 33
End: 2018-01-17

## 2018-01-17 ENCOUNTER — TELEPHONE (OUTPATIENT)
Dept: FAMILY MEDICINE CLINIC | Facility: CLINIC | Age: 33
End: 2018-01-17

## 2018-01-17 NOTE — TELEPHONE ENCOUNTER
Please read pt e-mail. Pt seen in [facility] on 1/9, Rx given for Augmentin, Flonase, & Ciprofloxacin-dexamethasone OTIC soln to left ear. Please advise on orders for pt. Thank you.

## 2018-01-17 NOTE — TELEPHONE ENCOUNTER
Called and spoke with pt. Pt informed she can try alternating ibuprofen and tylenol to help with pain. Pt states understanding.

## 2018-01-17 NOTE — TELEPHONE ENCOUNTER
Called and spoke with pt. Pt informed of MD message below. Pt scheduled for appt.   Future Appointments  Date Time Provider Yasmin Josephi   1/18/2018 9:00 AM [facility]   1/22/2018 1:40 PM Linda Mahajan MD EM[facility] 17 [facility] Da

## 2018-01-17 NOTE — TELEPHONE ENCOUNTER
Patient calling office to speak to a nurse about a reoccurring ear infection she has been having. She scheduled an appointment for 1/22. She would like to know if for the time being, there is anything she can take to help alleviate the pain.  Please advise

## 2018-01-17 NOTE — TELEPHONE ENCOUNTER
Pt states she received a mychart from Dr. Agapito Major stating she wanted to see her today or tomorrow. Ok to add pt to your schedule tomorrow? Pt is available all day accept 2:30-3:30pm.  Please advise.  Thank you

## 2018-01-17 NOTE — TELEPHONE ENCOUNTER
From: Balbina Mathis  To: Karsten Bradshaw MD  Sent: 1/17/2018 4:27 AM CST  Subject: Visit [facility] Dr. Alli Cruz,    I messaged you last week about the ear infection.  I did end up coming back into the walk in clinic Tuesday evening and receiv

## 2018-01-18 ENCOUNTER — OFFICE VISIT (OUTPATIENT)
Dept: FAMILY MEDICINE CLINIC | Facility: CLINIC | Age: 33
End: 2018-01-18

## 2018-01-18 VITALS
RESPIRATION RATE: 14 BRPM | HEIGHT: 67 IN | HEART RATE: 64 BPM | DIASTOLIC BLOOD PRESSURE: 72 MMHG | BODY MASS INDEX: 21.82 KG/M2 | SYSTOLIC BLOOD PRESSURE: 114 MMHG | TEMPERATURE: 98 F | OXYGEN SATURATION: 97 % | WEIGHT: 139 LBS

## 2018-01-18 DIAGNOSIS — H69.82 DYSFUNCTION OF LEFT EUSTACHIAN TUBE: Primary | ICD-10-CM

## 2018-01-18 DIAGNOSIS — J01.00 ACUTE NON-RECURRENT MAXILLARY SINUSITIS: ICD-10-CM

## 2018-01-18 PROCEDURE — 99213 OFFICE O/P EST LOW 20 MIN: CPT | Performed by: FAMILY MEDICINE

## 2018-01-18 RX ORDER — METHYLPREDNISOLONE 4 MG/1
TABLET ORAL
Qty: 1 KIT | Refills: 0 | Status: SHIPPED | OUTPATIENT
Start: 2018-01-18 | End: 2018-03-27

## 2018-01-18 RX ORDER — LEVOFLOXACIN 500 MG/1
500 TABLET, FILM COATED ORAL DAILY
Qty: 5 TABLET | Refills: 0 | Status: SHIPPED | OUTPATIENT
Start: 2018-01-18 | End: 2018-03-27

## 2018-01-18 NOTE — PROGRESS NOTES
Patient presents with:  Ear Pain: c/o continued LT ear pain       Pierce Denver is a 29year old female who presents for  L ear  Fulness sensation. Problem last  2  weeks. Not worse or better, no sick contact. No ear discharge. No swelling of the ear. Right arm, Patient Position: Sitting, Cuff Size: adult)   Pulse 64   Temp 98.1 °F (36.7 °C) (Oral)   Resp 14   Ht 67\"   Wt 139 lb   LMP 01/08/2018 (Exact Date)   SpO2 97%   BMI 21.77 kg/m²   GENERAL: well developed, well nourished,in no apparent distress

## 2018-01-18 NOTE — PATIENT INSTRUCTIONS
ENT group:they come to Dr. Reji Madrigal clinic twice a week. Dr. Radonna Leventhal Dr. Margaree Freer Dr.R. Flossie Patee    [address] [facility]  [address] [facility], [address] [facility]  [phone].

## 2018-03-27 ENCOUNTER — OFFICE VISIT (OUTPATIENT)
Dept: OBGYN CLINIC | Facility: CLINIC | Age: 33
End: 2018-03-27

## 2018-03-27 VITALS
WEIGHT: 141 LBS | BODY MASS INDEX: 22.66 KG/M2 | HEART RATE: 74 BPM | DIASTOLIC BLOOD PRESSURE: 68 MMHG | HEIGHT: 66.25 IN | SYSTOLIC BLOOD PRESSURE: 110 MMHG

## 2018-03-27 DIAGNOSIS — Z12.39 BREAST CANCER SCREENING: ICD-10-CM

## 2018-03-27 DIAGNOSIS — Z12.4 CERVICAL CANCER SCREENING: ICD-10-CM

## 2018-03-27 DIAGNOSIS — Z01.419 WELL WOMAN EXAM: Primary | ICD-10-CM

## 2018-03-27 PROCEDURE — 99395 PREV VISIT EST AGE 18-39: CPT | Performed by: OBSTETRICS & GYNECOLOGY

## 2018-03-27 NOTE — PROGRESS NOTES
GYN H&P     3/27/2018  11:16 AM    CC: Patient presents with:  Physical      HPI: patient is a 34year old  here for her annual gyne exam.   Pt delivered 10/17. Menses are regular.    No pelvic pain  No vaginal discharge  Contraception: none, decli medications on file prior to visit. No family history on file.     Social History  Social History   Marital status:   Spouse name: N/A    Years of education: N/A  Number of children: N/A     Occupational History  None on file     Social History Alexa appearing, no lesions. Bladder: well supported, urethra wnl, no lesions                     Vagina: normal pink mucosa, no lesions, normal clear discharge.                       Uterus: mobile, non tender, normal size                     Cervix: p

## 2019-01-23 ENCOUNTER — PATIENT MESSAGE (OUTPATIENT)
Dept: FAMILY MEDICINE CLINIC | Facility: CLINIC | Age: 34
End: 2019-01-23

## 2019-01-24 NOTE — TELEPHONE ENCOUNTER
From: Buzz Salamanca  To: Linda Mahajan MD  Sent: 1/23/2019 3:26 PM CST  Subject: Non-Urgent Medical Question    Hi! I have been feeling light-headed for about a week now on and off. Mostly when I’d get up from laying or sitting.  Today it has been

## 2019-01-25 ENCOUNTER — TELEPHONE (OUTPATIENT)
Dept: FAMILY MEDICINE CLINIC | Facility: CLINIC | Age: 34
End: 2019-01-25

## 2019-01-25 ENCOUNTER — OFFICE VISIT (OUTPATIENT)
Dept: FAMILY MEDICINE CLINIC | Facility: CLINIC | Age: 34
End: 2019-01-25
Payer: COMMERCIAL

## 2019-01-25 ENCOUNTER — LAB ENCOUNTER (OUTPATIENT)
Dept: LAB | Age: 34
End: 2019-01-25
Attending: FAMILY MEDICINE
Payer: COMMERCIAL

## 2019-01-25 VITALS
HEART RATE: 68 BPM | TEMPERATURE: 99 F | WEIGHT: 146 LBS | HEIGHT: 67 IN | RESPIRATION RATE: 18 BRPM | OXYGEN SATURATION: 99 % | BODY MASS INDEX: 22.91 KG/M2 | SYSTOLIC BLOOD PRESSURE: 112 MMHG | DIASTOLIC BLOOD PRESSURE: 68 MMHG

## 2019-01-25 DIAGNOSIS — R10.84 GENERALIZED ABDOMINAL PAIN: ICD-10-CM

## 2019-01-25 DIAGNOSIS — Z13.29 SCREENING FOR ENDOCRINE, NUTRITIONAL, METABOLIC AND IMMUNITY DISORDER: ICD-10-CM

## 2019-01-25 DIAGNOSIS — H81.13 BENIGN PAROXYSMAL POSITIONAL VERTIGO DUE TO BILATERAL VESTIBULAR DISORDER: ICD-10-CM

## 2019-01-25 DIAGNOSIS — Z13.228 SCREENING FOR ENDOCRINE, NUTRITIONAL, METABOLIC AND IMMUNITY DISORDER: Primary | ICD-10-CM

## 2019-01-25 DIAGNOSIS — Z13.0 SCREENING FOR ENDOCRINE, NUTRITIONAL, METABOLIC AND IMMUNITY DISORDER: Primary | ICD-10-CM

## 2019-01-25 DIAGNOSIS — N92.6 IRREGULAR PERIODS: ICD-10-CM

## 2019-01-25 DIAGNOSIS — Z13.29 SCREENING FOR ENDOCRINE, NUTRITIONAL, METABOLIC AND IMMUNITY DISORDER: Primary | ICD-10-CM

## 2019-01-25 DIAGNOSIS — Z13.21 SCREENING FOR ENDOCRINE, NUTRITIONAL, METABOLIC AND IMMUNITY DISORDER: ICD-10-CM

## 2019-01-25 DIAGNOSIS — Z13.228 SCREENING FOR ENDOCRINE, NUTRITIONAL, METABOLIC AND IMMUNITY DISORDER: ICD-10-CM

## 2019-01-25 DIAGNOSIS — Z90.49 STATUS POST CHOLECYSTECTOMY: ICD-10-CM

## 2019-01-25 DIAGNOSIS — Z13.21 SCREENING FOR ENDOCRINE, NUTRITIONAL, METABOLIC AND IMMUNITY DISORDER: Primary | ICD-10-CM

## 2019-01-25 DIAGNOSIS — Z13.0 SCREENING FOR ENDOCRINE, NUTRITIONAL, METABOLIC AND IMMUNITY DISORDER: ICD-10-CM

## 2019-01-25 PROBLEM — O28.3 FETAL ECHOGENIC INTRACARDIAC FOCUS ON PRENATAL ULTRASOUND: Status: RESOLVED | Noted: 2017-06-12 | Resolved: 2019-01-25

## 2019-01-25 LAB
ALBUMIN SERPL-MCNC: 4.4 G/DL (ref 3.1–4.5)
ALBUMIN/GLOB SERPL: 1.5 {RATIO} (ref 1–2)
ALP LIVER SERPL-CCNC: 42 U/L (ref 37–98)
ALT SERPL-CCNC: 13 U/L (ref 14–54)
ANION GAP SERPL CALC-SCNC: 8 MMOL/L (ref 0–18)
AST SERPL-CCNC: 11 U/L (ref 15–41)
BASOPHILS # BLD AUTO: 0.05 X10(3) UL (ref 0–0.1)
BASOPHILS NFR BLD AUTO: 0.7 %
BILIRUB SERPL-MCNC: 11 MG/DL (ref 0.1–2)
BUN BLD-MCNC: 10 MG/DL (ref 8–20)
BUN/CREAT SERPL: 15.6 (ref 10–20)
CALCIUM BLD-MCNC: 8.7 MG/DL (ref 8.3–10.3)
CHLORIDE SERPL-SCNC: 108 MMOL/L (ref 101–111)
CHOLEST SMN-MCNC: 87 MG/DL (ref ?–200)
CO2 SERPL-SCNC: 26 MMOL/L (ref 22–32)
CREAT BLD-MCNC: 0.64 MG/DL (ref 0.55–1.02)
EOSINOPHIL # BLD AUTO: 0.08 X10(3) UL (ref 0–0.3)
EOSINOPHIL NFR BLD AUTO: 1.1 %
ERYTHROCYTE [DISTWIDTH] IN BLOOD BY AUTOMATED COUNT: 19.3 % (ref 11.5–16)
GLOBULIN PLAS-MCNC: 3 G/DL (ref 2.8–4.4)
GLUCOSE BLD-MCNC: 87 MG/DL (ref 70–99)
HCT VFR BLD AUTO: 30.9 % (ref 34–50)
HDLC SERPL-MCNC: 60 MG/DL (ref 40–59)
HGB BLD-MCNC: 10.2 G/DL (ref 12–16)
IMMATURE GRANULOCYTE COUNT: 0.04 X10(3) UL (ref 0–1)
IMMATURE GRANULOCYTE RATIO %: 0.6 %
IRON SATURATION: 37 % (ref 15–50)
IRON: 111 UG/DL (ref 28–170)
LDLC SERPL CALC-MCNC: 16 MG/DL (ref ?–100)
LYMPHOCYTES # BLD AUTO: 1.56 X10(3) UL (ref 0.9–4)
LYMPHOCYTES NFR BLD AUTO: 21.8 %
M PROTEIN MFR SERPL ELPH: 7.4 G/DL (ref 6.4–8.2)
MCH RBC QN AUTO: 28.7 PG (ref 27–33.2)
MCHC RBC AUTO-ENTMCNC: 33 G/DL (ref 31–37)
MCV RBC AUTO: 87 FL (ref 81–100)
MONOCYTES # BLD AUTO: 0.39 X10(3) UL (ref 0.1–1)
MONOCYTES NFR BLD AUTO: 5.4 %
NEUTROPHIL ABS PRELIM: 5.04 X10 (3) UL (ref 1.3–6.7)
NEUTROPHILS # BLD AUTO: 5.04 X10(3) UL (ref 1.3–6.7)
NEUTROPHILS NFR BLD AUTO: 70.4 %
NONHDLC SERPL-MCNC: 27 MG/DL (ref ?–130)
OSMOLALITY SERPL CALC.SUM OF ELEC: 292 MOSM/KG (ref 275–295)
PLATELET # BLD AUTO: 227 10(3)UL (ref 150–450)
POTASSIUM SERPL-SCNC: 3.5 MMOL/L (ref 3.6–5.1)
RBC # BLD AUTO: 3.55 X10(6)UL (ref 3.8–5.1)
RED CELL DISTRIBUTION WIDTH-SD: 59.9 FL (ref 35.1–46.3)
SODIUM SERPL-SCNC: 142 MMOL/L (ref 136–144)
TOTAL IRON BINDING CAPACITY: 304 UG/DL (ref 240–450)
TRANSFERRIN SERPL-MCNC: 204 MG/DL (ref 200–360)
TRIGL SERPL-MCNC: 53 MG/DL (ref 30–149)
TSI SER-ACNC: 1.49 MIU/ML (ref 0.35–5.5)
VLDLC SERPL CALC-MCNC: 11 MG/DL (ref 0–30)
WBC # BLD AUTO: 7.2 X10(3) UL (ref 4–13)

## 2019-01-25 PROCEDURE — 83540 ASSAY OF IRON: CPT | Performed by: FAMILY MEDICINE

## 2019-01-25 PROCEDURE — 99214 OFFICE O/P EST MOD 30 MIN: CPT | Performed by: FAMILY MEDICINE

## 2019-01-25 PROCEDURE — 84146 ASSAY OF PROLACTIN: CPT | Performed by: FAMILY MEDICINE

## 2019-01-25 PROCEDURE — 83550 IRON BINDING TEST: CPT | Performed by: FAMILY MEDICINE

## 2019-01-25 PROCEDURE — 83001 ASSAY OF GONADOTROPIN (FSH): CPT | Performed by: FAMILY MEDICINE

## 2019-01-25 PROCEDURE — 82306 VITAMIN D 25 HYDROXY: CPT | Performed by: FAMILY MEDICINE

## 2019-01-25 PROCEDURE — 80050 GENERAL HEALTH PANEL: CPT | Performed by: FAMILY MEDICINE

## 2019-01-25 PROCEDURE — 83002 ASSAY OF GONADOTROPIN (LH): CPT | Performed by: FAMILY MEDICINE

## 2019-01-25 PROCEDURE — 36415 COLL VENOUS BLD VENIPUNCTURE: CPT | Performed by: FAMILY MEDICINE

## 2019-01-25 PROCEDURE — 80061 LIPID PANEL: CPT | Performed by: FAMILY MEDICINE

## 2019-01-25 RX ORDER — FOLIC ACID 1 MG/1
TABLET ORAL DAILY
COMMUNITY
End: 2019-11-20

## 2019-01-25 RX ORDER — MECLIZINE HCL 12.5 MG/1
12.5 TABLET ORAL 3 TIMES DAILY PRN
Qty: 40 TABLET | Refills: 0 | Status: SHIPPED | OUTPATIENT
Start: 2019-01-25 | End: 2019-09-11 | Stop reason: ALTCHOICE

## 2019-01-25 RX ORDER — DICYCLOMINE HYDROCHLORIDE 10 MG/1
10 CAPSULE ORAL 3 TIMES DAILY PRN
Qty: 40 CAPSULE | Refills: 3 | Status: SHIPPED | OUTPATIENT
Start: 2019-01-25 | End: 2019-02-04

## 2019-01-25 NOTE — PROGRESS NOTES
Patient presents with:  Dizziness: Irreguler cycles        HPI:      The patient has complaints of dizziness. Pt reports vertigo, \"room is spinning around symptoms\",any head movement makes it worse started suddenly:last few days, pt never got it before. S2, with no murmurs. Lungs: are clear to auscultation bilaterally, with no wheeze, rhonchi, or rales. Abdomen: is soft, NT/ND with no HSM. No rebound or guarding, NABS. Extremities: are symmetric with no cyanosis, clubbing, or edema.     Skin: is u

## 2019-01-25 NOTE — TELEPHONE ENCOUNTER
Patient wants to know when her lab results come back if a copy of the results can be faxed over to Dr. Skyler García, her hematologist.  His fax number is [phone] and his office number is [phone].

## 2019-01-25 NOTE — PATIENT INSTRUCTIONS
Dizziness (Vertigo) and Balance Problems: Staying Safe     Replace burned-out light bulbs to keep your home safe and well lit. Falls or accidents can lead to pain, broken bones, and fear of future falls.  Protect yourself and others by preparing for e · Take public transportation. · Walk to stores and other places when you can. Asking for help  Don't be afraid to ask for help running errands, cooking meals, and doing exercise.  Whether it's a friend, loved one, neighbor, or stranger on the street, antonio hoyos

## 2019-01-25 NOTE — TELEPHONE ENCOUNTER
Pt notified via [employer] that her results are not yet back yet, since they were drawn earlier today. Informed her that we would contact her when her results are back.

## 2019-01-26 ENCOUNTER — HOSPITAL ENCOUNTER (OUTPATIENT)
Dept: ULTRASOUND IMAGING | Age: 34
Discharge: HOME | End: 2019-01-26
Attending: FAMILY MEDICINE
Payer: COMMERCIAL

## 2019-01-26 DIAGNOSIS — N92.6 IRREGULAR PERIODS: ICD-10-CM

## 2019-01-26 PROCEDURE — 76856 US EXAM PELVIC COMPLETE: CPT | Performed by: FAMILY MEDICINE

## 2019-01-26 PROCEDURE — 76830 TRANSVAGINAL US NON-OB: CPT | Performed by: FAMILY MEDICINE

## 2019-01-28 ENCOUNTER — OFFICE VISIT (OUTPATIENT)
Dept: PHYSICAL THERAPY | Age: 34
End: 2019-01-28
Attending: FAMILY MEDICINE
Payer: COMMERCIAL

## 2019-01-28 ENCOUNTER — TELEPHONE (OUTPATIENT)
Dept: FAMILY MEDICINE CLINIC | Facility: CLINIC | Age: 34
End: 2019-01-28

## 2019-01-28 DIAGNOSIS — H81.13 BENIGN PAROXYSMAL POSITIONAL VERTIGO DUE TO BILATERAL VESTIBULAR DISORDER: ICD-10-CM

## 2019-01-28 LAB
FSH SERPL-ACNC: 7.2 MIU/ML
LH: 6.1 MIU/ML
PROLACTIN: 5.7 NG/ML
VIT D+METAB SERPL-MCNC: 12.9 NG/ML (ref 30–100)

## 2019-01-28 PROCEDURE — 95992 CANALITH REPOSITIONING PROC: CPT | Performed by: PHYSICAL THERAPIST

## 2019-01-28 PROCEDURE — 97161 PT EVAL LOW COMPLEX 20 MIN: CPT | Performed by: PHYSICAL THERAPIST

## 2019-01-28 RX ORDER — ERGOCALCIFEROL 1.25 MG/1
50000 CAPSULE ORAL WEEKLY
Qty: 16 CAPSULE | Refills: 0 | Status: SHIPPED | OUTPATIENT
Start: 2019-01-28 | End: 2019-09-11 | Stop reason: ALTCHOICE

## 2019-01-28 NOTE — PROGRESS NOTES
PHYSICAL THERAPY EVALUATION:   Referring Physician: Dr. Eleno Acosta  Diagnosis: BPPV      Date of Onset: 3 wks ago Date of Service: 1/28/2019   [facility]     PATIENT SUMMARY   Sony Torres is a 34year old y/o female who presents to thera reviewed with Lizzie Mabry. Significant findings include hereditary spherocytosis (anemia), cholecystectomy. ASSESSMENT:      Lizzie Mabry presents today with subjective positional dizziness, but assessment for BPPV was negative.   She was negative for [place] treatment: patient education: on canalith repositioning and pathophysiology of BPPV. Epley's maneuver for R post canal done with resolution of dizziness and headache post session.  [facility] AND [facility] R negative for subjective symptoms of dizziness post canalith

## 2019-01-28 NOTE — TELEPHONE ENCOUNTER
----- Message from Yesika Yu MD sent at 1/28/2019 12:57 PM CST -----  Anemia present I recommend insure stool test and see:  Dr. Marko Gramajo hematology.       Phone: [address]  [facility]

## 2019-01-28 NOTE — TELEPHONE ENCOUNTER
Pt notified of lab & US results & below orders. Pt declines stool test & sees Dr. Marissa Bojorquez for Anemia already, & will be f/u with him soon. All questions answered, pt expresses understanding.

## 2019-01-28 NOTE — TELEPHONE ENCOUNTER
Rafaela Shannon from  Dr. Cindy Neil office called and asked for the latest labs to be faxed to their office at fax# [phone].

## 2019-01-28 NOTE — TELEPHONE ENCOUNTER
----- Message from Jossie Roldan MD sent at 1/28/2019 12:58 PM CST -----  Also   Low vit. D, Rx 39945X for 4 months, weekly please.

## 2019-01-30 ENCOUNTER — APPOINTMENT (OUTPATIENT)
Dept: PHYSICAL THERAPY | Age: 34
End: 2019-01-30
Attending: FAMILY MEDICINE
Payer: COMMERCIAL

## 2019-02-03 ENCOUNTER — HOSPITAL ENCOUNTER (OUTPATIENT)
Dept: ULTRASOUND IMAGING | Age: 34
Discharge: HOME | End: 2019-02-03
Attending: FAMILY MEDICINE
Payer: COMMERCIAL

## 2019-02-03 DIAGNOSIS — R10.84 GENERALIZED ABDOMINAL PAIN: ICD-10-CM

## 2019-02-03 PROCEDURE — 76700 US EXAM ABDOM COMPLETE: CPT | Performed by: FAMILY MEDICINE

## 2019-02-05 ENCOUNTER — TELEPHONE (OUTPATIENT)
Dept: FAMILY MEDICINE CLINIC | Facility: CLINIC | Age: 34
End: 2019-02-05

## 2019-02-05 NOTE — TELEPHONE ENCOUNTER
Patient informed of her results and need to see hematology. States she regularly sees Dr. Slime Laws every 6 months. She is due to see him in April, but will call to get her appointment moved up.

## 2019-02-06 ENCOUNTER — APPOINTMENT (OUTPATIENT)
Dept: PHYSICAL THERAPY | Age: 34
End: 2019-02-06
Attending: FAMILY MEDICINE
Payer: COMMERCIAL

## 2019-02-07 ENCOUNTER — OFFICE VISIT (OUTPATIENT)
Dept: FAMILY MEDICINE CLINIC | Facility: CLINIC | Age: 34
End: 2019-02-07
Payer: COMMERCIAL

## 2019-02-07 VITALS
BODY MASS INDEX: 22.76 KG/M2 | DIASTOLIC BLOOD PRESSURE: 70 MMHG | HEIGHT: 67 IN | RESPIRATION RATE: 16 BRPM | SYSTOLIC BLOOD PRESSURE: 122 MMHG | WEIGHT: 145 LBS | OXYGEN SATURATION: 98 % | HEART RATE: 72 BPM

## 2019-02-07 DIAGNOSIS — D58.0 SPHEROCYTOSIS (FAMILIAL) (HCC): Primary | ICD-10-CM

## 2019-02-07 DIAGNOSIS — R10.84 GENERALIZED ABDOMINAL PAIN: ICD-10-CM

## 2019-02-07 PROCEDURE — 99214 OFFICE O/P EST MOD 30 MIN: CPT | Performed by: FAMILY MEDICINE

## 2019-02-07 NOTE — PATIENT INSTRUCTIONS
Dr. Bal Boss M.D. Hematology/Oncology    Phone: [address] [place] [address], [address] [facility], [address] [place] [address]        Dr. Willard Malone M.D.   [employer]

## 2019-02-07 NOTE — PROGRESS NOTES
Irena Del Cid is a 34year old female who presents for abdominal pain. HPI:  The patient complaints of abdominal pain. Pain is located at Generalized. Pain is described as cramping. Severity is mild. Associated symptoms: none.  The pain radiates cholecystectomy with intraoperative cholangiogram   • LAPAROSCOPIC CHOLECYSTECTOMY N/A 5/5/2016    Performed by Khari Borjas MD at [address]      History reviewed. No pertinent family history.    Social History:  Social History    Tobacco Use      Sm

## 2019-02-21 ENCOUNTER — PATIENT MESSAGE (OUTPATIENT)
Dept: FAMILY MEDICINE CLINIC | Facility: CLINIC | Age: 34
End: 2019-02-21

## 2019-02-22 NOTE — TELEPHONE ENCOUNTER
From: Gail Kayser  To: Boni Iqbal MD  Sent: 2/21/2019 9:48 PM CST  Subject: Other    Hi! I was in a few weeks ago for vertigo. Dr. Valdivia Rings had me go to physical therapy.  Now my insurance is asking for a letter/proof stating that it was nece

## 2019-02-22 NOTE — TELEPHONE ENCOUNTER
Patient's insurance requiring letter of neccesity for PT in regards to vertigo. OK to provide this? Patient referred at his OV on 1/25/19.

## 2019-08-16 ENCOUNTER — OFFICE VISIT (OUTPATIENT)
Dept: FAMILY MEDICINE CLINIC | Facility: CLINIC | Age: 34
End: 2019-08-16
Payer: COMMERCIAL

## 2019-08-16 VITALS
BODY MASS INDEX: 22.76 KG/M2 | HEART RATE: 88 BPM | SYSTOLIC BLOOD PRESSURE: 120 MMHG | OXYGEN SATURATION: 99 % | RESPIRATION RATE: 18 BRPM | TEMPERATURE: 98 F | DIASTOLIC BLOOD PRESSURE: 70 MMHG | WEIGHT: 145 LBS | HEIGHT: 67 IN

## 2019-08-16 DIAGNOSIS — N30.00 ACUTE CYSTITIS WITHOUT HEMATURIA: Primary | ICD-10-CM

## 2019-08-16 LAB
MULTISTIX LOT#: ABNORMAL NUMERIC
NITRITE, URINE: POSITIVE
PH, URINE: 6 (ref 4.5–8)
PROTEIN (URINE DIPSTICK): 300 MG/DL
SPECIFIC GRAVITY: 1.03 (ref 1–1.03)
URINE-COLOR: YELLOW
UROBILINOGEN,SEMI-QN: 2 MG/DL (ref 0–1.9)

## 2019-08-16 PROCEDURE — 87077 CULTURE AEROBIC IDENTIFY: CPT | Performed by: PHYSICIAN ASSISTANT

## 2019-08-16 PROCEDURE — 87086 URINE CULTURE/COLONY COUNT: CPT | Performed by: PHYSICIAN ASSISTANT

## 2019-08-16 PROCEDURE — 87186 SC STD MICRODIL/AGAR DIL: CPT | Performed by: PHYSICIAN ASSISTANT

## 2019-08-16 PROCEDURE — 99213 OFFICE O/P EST LOW 20 MIN: CPT | Performed by: PHYSICIAN ASSISTANT

## 2019-08-16 PROCEDURE — 81003 URINALYSIS AUTO W/O SCOPE: CPT | Performed by: PHYSICIAN ASSISTANT

## 2019-08-16 RX ORDER — NITROFURANTOIN 25; 75 MG/1; MG/1
100 CAPSULE ORAL 2 TIMES DAILY
Qty: 14 CAPSULE | Refills: 0 | Status: SHIPPED | OUTPATIENT
Start: 2019-08-16 | End: 2019-08-23

## 2019-08-16 NOTE — PROGRESS NOTES
CHIEF COMPLAINT:   Patient presents with:  UTI: burning and frequency x 2 days       HPI:   Nancy Murdock is a 30year old female who presents with symptoms of UTI. The patient reports urinary frequency, urgency, and dysuria for last 2 days.  Symptom headaches.     EXAM:   /70 (BP Location: Left arm, Patient Position: Sitting, Cuff Size: adult)   Pulse 88   Temp 98 °F (36.7 °C) (Oral)   Resp 18   Ht 67\"   Wt 145 lb   LMP 08/06/2019   SpO2 99%   BMI 22.71 kg/m²   GENERAL: well developed, well nour issues and agrees to the plan. The patient is asked to return in 3 days if not better. Call if fever, vomiting, worsening symptoms.

## 2019-08-16 NOTE — PATIENT INSTRUCTIONS
Patient Declined AVS    Verbal Instructions given      1. Macrobid  2. Urine culture sent  3. Increase fluids  4.  Follow up with PCP

## 2019-08-18 ENCOUNTER — TELEPHONE (OUTPATIENT)
Dept: FAMILY MEDICINE CLINIC | Facility: CLINIC | Age: 34
End: 2019-08-18

## 2019-09-11 ENCOUNTER — OFFICE VISIT (OUTPATIENT)
Dept: OBGYN CLINIC | Facility: CLINIC | Age: 34
End: 2019-09-11
Payer: COMMERCIAL

## 2019-09-11 VITALS
WEIGHT: 147 LBS | HEIGHT: 67 IN | BODY MASS INDEX: 23.07 KG/M2 | SYSTOLIC BLOOD PRESSURE: 116 MMHG | DIASTOLIC BLOOD PRESSURE: 70 MMHG | HEART RATE: 83 BPM

## 2019-09-11 DIAGNOSIS — Z01.419 WELL WOMAN EXAM WITH ROUTINE GYNECOLOGICAL EXAM: Primary | ICD-10-CM

## 2019-09-11 PROCEDURE — 99395 PREV VISIT EST AGE 18-39: CPT | Performed by: OBSTETRICS & GYNECOLOGY

## 2019-09-11 PROCEDURE — 87624 HPV HI-RISK TYP POOLED RSLT: CPT | Performed by: OBSTETRICS & GYNECOLOGY

## 2019-09-11 PROCEDURE — 88175 CYTOPATH C/V AUTO FLUID REDO: CPT | Performed by: OBSTETRICS & GYNECOLOGY

## 2019-09-11 NOTE — PROGRESS NOTES
GYN H&P     2019  12:30 PM    CC: Patient is here for annual exam    HPI: patient is a 30year old  here for her annual gyn exam.   She has no complaints. Menses are regular. She had previously been worked up for pelvic pain with eating occasion change. HENT: Negative for congestion. Respiratory: Negative for shortness of breath and wheezing. Cardiovascular: Negative for chest pain. Gastrointestinal: Negative for vomiting, abdominal pain and abdominal distention.    Genitourinary: Positiv is normal.          A/P: Patient is 30year old female with no complaints.  Here for well woman exam.   1. Well woman exam with routine gynecological exam        Patient Active Problem List:     Hereditary spherocytosis ([facility] [address]     Irregular periods     Deisy

## 2019-09-12 ENCOUNTER — OFFICE VISIT (OUTPATIENT)
Dept: FAMILY MEDICINE CLINIC | Facility: CLINIC | Age: 34
End: 2019-09-12
Payer: COMMERCIAL

## 2019-09-12 VITALS
BODY MASS INDEX: 23 KG/M2 | HEART RATE: 77 BPM | RESPIRATION RATE: 16 BRPM | DIASTOLIC BLOOD PRESSURE: 66 MMHG | SYSTOLIC BLOOD PRESSURE: 110 MMHG | WEIGHT: 147 LBS | TEMPERATURE: 98 F | OXYGEN SATURATION: 98 %

## 2019-09-12 DIAGNOSIS — R05.8 PRODUCTIVE COUGH: ICD-10-CM

## 2019-09-12 DIAGNOSIS — R05.8 COUGH PRESENT FOR GREATER THAN 3 WEEKS: Primary | ICD-10-CM

## 2019-09-12 LAB — HPV I/H RISK 1 DNA SPEC QL NAA+PROBE: NEGATIVE

## 2019-09-12 PROCEDURE — 99213 OFFICE O/P EST LOW 20 MIN: CPT | Performed by: NURSE PRACTITIONER

## 2019-09-12 RX ORDER — AZITHROMYCIN 250 MG/1
TABLET, FILM COATED ORAL
Qty: 6 TABLET | Refills: 0 | Status: SHIPPED | OUTPATIENT
Start: 2019-09-12 | End: 2019-11-20 | Stop reason: ALTCHOICE

## 2019-09-12 NOTE — PROGRESS NOTES
CHIEF COMPLAINT:   Patient presents with:  Cough: congestion x 3 weeks. HPI:   Zachary Ro is a 30year old female who presents for upper respiratory symptoms for  3 weeks.  Patient reports sore throat only at the beginning of sx's, congestion, adult)   Pulse 77   Temp 98.1 °F (36.7 °C) (Oral)   Resp 16   Wt 147 lb   LMP 08/30/2019 (Approximate)   SpO2 98%   BMI 23.02 kg/m²   GENERAL: well developed, well nourished,in no apparent distress  SKIN: no rashes,no suspicious lesions  HEAD: atraumatic,

## 2019-11-19 ENCOUNTER — TELEPHONE (OUTPATIENT)
Dept: FAMILY MEDICINE CLINIC | Facility: CLINIC | Age: 34
End: 2019-11-19

## 2019-11-19 NOTE — TELEPHONE ENCOUNTER
Carito from Dr [employer] office called asking if we had any consults notes or recent labs to fax- she states she didn't received them. She states she has appt notes that labs and consult notes will be faxed via \"right fax\"-a secure faxing system.   Carito juárez

## 2019-11-19 NOTE — PROGRESS NOTES
[facility] Report of Consultation      Patient Name: Emre Martines   YOB: 1985  Medical Record Number: [id number]  Consulting Physician: Najma Jordan M.D.    Referring Physician: Jurgen Ellis MD    Date of Consultation: No nausea, vomiting, diarrhea, constipation, melena, hematochezia, heartburn, early satiety, change in stool caliber. Genitourinary      No hematuria, dysuria, increased frequency, urgency, hesitancy, incontinence, vaginal bleeding.   Musculoskeletal   No today.       Laboratory   Recent Results (from the past 48 hour(s))   COMP METABOLIC PANEL (14)    Collection Time: 11/20/19  1:38 PM   Result Value Ref Range    Glucose 99 70 - 99 mg/dL    Sodium 139 136 - 145 mmol/L    Potassium 3.4 (L) 3.5 - 5.1 mmol/L Neutrophil Absolute 3.74 1.50 - 7.70 x10(3) uL    Lymphocyte Absolute 1.61 1.00 - 4.00 x10(3) uL    Monocyte Absolute 0.31 0.10 - 1.00 x10(3) uL    Eosinophil Absolute 0.07 0.00 - 0.70 x10(3) uL    Basophil Absolute 0.04 0.00 - 0.20 x10(3) uL    Immature G

## 2019-11-20 ENCOUNTER — OFFICE VISIT (OUTPATIENT)
Dept: HEMATOLOGY/ONCOLOGY | Age: 34
End: 2019-11-20
Attending: SPECIALIST
Payer: COMMERCIAL

## 2019-11-20 VITALS
HEART RATE: 80 BPM | SYSTOLIC BLOOD PRESSURE: 133 MMHG | OXYGEN SATURATION: 99 % | BODY MASS INDEX: 23 KG/M2 | WEIGHT: 148.81 LBS | RESPIRATION RATE: 18 BRPM | TEMPERATURE: 99 F | DIASTOLIC BLOOD PRESSURE: 82 MMHG

## 2019-11-20 DIAGNOSIS — R16.1 SPLENOMEGALY: ICD-10-CM

## 2019-11-20 DIAGNOSIS — D58.0 HEREDITARY SPHEROCYTOSIS (HCC): Primary | ICD-10-CM

## 2019-11-20 DIAGNOSIS — D64.9 NORMOCYTIC NORMOCHROMIC ANEMIA: ICD-10-CM

## 2019-11-20 PROCEDURE — 99205 OFFICE O/P NEW HI 60 MIN: CPT | Performed by: SPECIALIST

## 2019-11-20 RX ORDER — MULTIVIT WITH CALCIUM,IRON,MIN
1 TABLET ORAL DAILY
COMMUNITY
End: 2021-01-06

## 2019-11-20 NOTE — PROGRESS NOTES
Patient is here today for Consult with Mikael Zaidi . Patient denies pain. Here today for Spherocytosis -  Medication list and medical history were reviewed and updated.      Education Record    Learner:  Patient    Disease / Diagnosis: Consult    Barriers /

## 2019-12-23 NOTE — TELEPHONE ENCOUNTER
----- Message from Jaida Pruitt MD sent at 2/4/2019  7:03 PM CST -----  Pt has enlarged spleen and anemia, needs to see Dr. Shreya Lira soon.     Hematology    Phone: [facility]  [phone] noted to have frequent PVC's, about 1 every 5 beats  Pt stable /74 (MAP 96) HR 81  SOD paged, resident returned call and said he will come to review tele  Pt stable with no complaints  Will continue to monitor

## 2020-02-17 ENCOUNTER — PATIENT MESSAGE (OUTPATIENT)
Dept: FAMILY MEDICINE CLINIC | Facility: CLINIC | Age: 35
End: 2020-02-17

## 2020-02-17 RX ORDER — CHOLESTYRAMINE 4 G/9G
4 POWDER, FOR SUSPENSION ORAL 2 TIMES DAILY
Qty: 90 EACH | Refills: 6 | Status: SHIPPED | OUTPATIENT
Start: 2020-02-17 | End: 2020-02-27 | Stop reason: ALTCHOICE

## 2020-02-17 NOTE — TELEPHONE ENCOUNTER
From: Gail Kayser  To:  Ammon Proctor MD  Sent: 2/17/2020 2:29 PM CST  Subject: Prescription Question    Hi Dr. Skip Crump,    My mom, Skylar Wesley, went and saw Dr. Hernandez Congress and mentioned me with not feeling ever since I got my gallbladder taken out an

## 2020-02-25 ENCOUNTER — PATIENT MESSAGE (OUTPATIENT)
Dept: FAMILY MEDICINE CLINIC | Facility: CLINIC | Age: 35
End: 2020-02-25

## 2020-02-26 NOTE — TELEPHONE ENCOUNTER
From: Rich Fitzpatrick  To: Rony Betancur MD  Sent: 2/25/2020 6:03 PM CST  Subject: Prescription Question    Hi again! So I tried to take the cholestyramine. Is there anything similar to take that is pill form? I am not good at drinking this.

## 2020-02-27 RX ORDER — MONTELUKAST SODIUM 4 MG/1
1 TABLET, CHEWABLE ORAL 3 TIMES DAILY
Qty: 90 TABLET | Refills: 5 | Status: SHIPPED | OUTPATIENT
Start: 2020-02-27 | End: 2021-01-04

## 2020-02-27 NOTE — TELEPHONE ENCOUNTER
Regarding: Prescription Question  Contact: [phone]  ----- Message from Leo Mayers sent at 2/26/2020  8:32 AM CST -----       ----- Message from Shaye Vieira to Narcisa Huang MD sent at 2/25/2020  6:03 PM -----   Hi again!  So I trie

## 2020-05-08 ENCOUNTER — TELEPHONE (OUTPATIENT)
Dept: HEMATOLOGY/ONCOLOGY | Facility: HOSPITAL | Age: 35
End: 2020-05-08

## 2020-05-08 NOTE — TELEPHONE ENCOUNTER
LVM for patient. PLFD closed. Will reschedule for [place] with labs before, same date and time. Pt to call back with conflicts.

## 2020-05-20 ENCOUNTER — APPOINTMENT (OUTPATIENT)
Dept: HEMATOLOGY/ONCOLOGY | Age: 35
End: 2020-05-20
Attending: SPECIALIST
Payer: COMMERCIAL

## 2020-08-23 ENCOUNTER — HOSPITAL ENCOUNTER (OUTPATIENT)
Age: 35
Discharge: HOME | End: 2020-08-23
Payer: COMMERCIAL

## 2020-08-23 VITALS
RESPIRATION RATE: 18 BRPM | OXYGEN SATURATION: 100 % | HEART RATE: 83 BPM | SYSTOLIC BLOOD PRESSURE: 127 MMHG | DIASTOLIC BLOOD PRESSURE: 78 MMHG | TEMPERATURE: 98 F

## 2020-08-23 DIAGNOSIS — M75.22 BICEPS TENDONITIS ON LEFT: Primary | ICD-10-CM

## 2020-08-23 PROCEDURE — 99203 OFFICE O/P NEW LOW 30 MIN: CPT | Performed by: PHYSICIAN ASSISTANT

## 2020-08-23 RX ORDER — PREDNISONE 20 MG/1
40 TABLET ORAL DAILY
Qty: 10 TABLET | Refills: 0 | Status: SHIPPED | OUTPATIENT
Start: 2020-08-23 | End: 2020-08-28

## 2020-08-23 NOTE — ED PROVIDER NOTES
Patient Seen in: [address] [facility]      History   Patient presents with:  Arm Pain  Shoulder Pain    Stated Complaint: arm pain-numbness    HPI    Pleasant 21-year-old female. Medical history of hereditary spherocytosis, cholecystectomy. Monthly or less      Comment: one or 2 per month    Drug use: No             Review of Systems    Positive for stated complaint: arm pain-numbness  Other systems are as noted in HPI. Constitutional and vital signs reviewed.       All other systems reviewed time.            Disposition and Plan     Clinical Impression:  Biceps tendonitis on left  (primary encounter diagnosis)    Disposition:  Discharge  8/23/2020 11:04 am    Follow-up:  Antonia Burleson, [address] [phone]

## 2020-08-23 NOTE — ED INITIAL ASSESSMENT (HPI)
Patient states she has had left arm pain for 5 days. Left shoulder pain for 3 days. States pain increases with movement. Does not recall an injury, although has a 3year old that she lifts a lot.

## 2021-01-05 RX ORDER — MONTELUKAST SODIUM 4 MG/1
1 TABLET, CHEWABLE ORAL 3 TIMES DAILY
Qty: 90 TABLET | Refills: 5 | Status: SHIPPED | OUTPATIENT
Start: 2021-01-05 | End: 2021-01-06 | Stop reason: RX

## 2021-01-06 ENCOUNTER — TELEPHONE (OUTPATIENT)
Dept: FAMILY MEDICINE CLINIC | Facility: CLINIC | Age: 36
End: 2021-01-06

## 2021-01-06 RX ORDER — CHOLESTYRAMINE 4 G/9G
4 POWDER, FOR SUSPENSION ORAL 2 TIMES DAILY
Qty: 180 EACH | Refills: 0 | Status: SHIPPED | OUTPATIENT
Start: 2021-01-06 | End: 2021-01-12 | Stop reason: ALTCHOICE

## 2021-01-11 ENCOUNTER — PATIENT MESSAGE (OUTPATIENT)
Dept: FAMILY MEDICINE CLINIC | Facility: CLINIC | Age: 36
End: 2021-01-11

## 2021-01-11 NOTE — TELEPHONE ENCOUNTER
From: Balbina Mathis  To: Yesika Yu MD  Sent: 1/11/2021 1:21 PM CST  Subject: Prescription Question    Hi! I am trying to get the Colestipol tablets refilled.  I called the [facility] on [address] and they said it was on back order and tried t

## 2021-01-12 ENCOUNTER — PATIENT MESSAGE (OUTPATIENT)
Dept: FAMILY MEDICINE CLINIC | Facility: CLINIC | Age: 36
End: 2021-01-12

## 2021-01-12 RX ORDER — MONTELUKAST SODIUM 4 MG/1
1 TABLET, CHEWABLE ORAL 3 TIMES DAILY
Qty: 30 TABLET | Refills: 2 | Status: SHIPPED | OUTPATIENT
Start: 2021-01-12 | End: 2021-03-21

## 2021-01-12 NOTE — TELEPHONE ENCOUNTER
From: Jesika Linker  To: Marcella Hernandez MD  Sent: 1/12/2021 11:23 AM CST  Subject: Prescription Question    I have found the Colestipol 1 gram at [facility]  They only have about 30 tablets.  Is there any way I could

## 2021-03-22 RX ORDER — MONTELUKAST SODIUM 4 MG/1
1 TABLET, CHEWABLE ORAL 3 TIMES DAILY
Qty: 90 TABLET | Refills: 1 | Status: SHIPPED | OUTPATIENT
Start: 2021-03-22 | End: 2021-12-20

## 2021-03-22 NOTE — TELEPHONE ENCOUNTER
Medication(s) to Refill:   Requested Prescriptions     Pending Prescriptions Disp Refills   • Colestipol HCl 1 g Oral Tab 30 tablet 2     Sig: Take 1 tablet (1 g total) by mouth 3 (three) times daily.          Reason for Medication Refill being sent to [facility]

## 2021-04-13 ENCOUNTER — TELEPHONE (OUTPATIENT)
Dept: FAMILY MEDICINE CLINIC | Facility: CLINIC | Age: 36
End: 2021-04-13

## 2021-04-13 NOTE — TELEPHONE ENCOUNTER
LOV- 2/7/2019    Received refill request for Cholestyramine 4gm packets, however chart states cancel Cholestyramine RX    Please advise

## 2021-12-06 ENCOUNTER — PATIENT MESSAGE (OUTPATIENT)
Dept: FAMILY MEDICINE CLINIC | Facility: CLINIC | Age: 36
End: 2021-12-06

## 2021-12-06 NOTE — TELEPHONE ENCOUNTER
From: Jarad Koch  To: Bob Alejandro MD  Sent: 12/6/2021 11:33 AM CST  Subject: Bruising Question    I have noticed the past few months right before receiving my period, I have bruising appearing in my inner thighs. Always the same spot.  It will

## 2021-12-20 RX ORDER — MONTELUKAST SODIUM 4 MG/1
TABLET, CHEWABLE ORAL
Qty: 90 TABLET | Refills: 1 | Status: SHIPPED | OUTPATIENT
Start: 2021-12-20

## 2021-12-28 NOTE — TELEPHONE ENCOUNTER
CLINICAL NUTRITION SERVICES - REASSESSMENT NOTE     Nutrition Prescription    RECOMMENDATIONS FOR MDs/PROVIDERS TO ORDER:  Encourage PO intake    Malnutrition Status:    Severe malnutrition in the context of acute on chronic illness    Recommendations already ordered by Registered Dietitian (RD):  1. Discontinue enteral/FWF/modular orders with loss of EN access  2. Continue ONS as ordered, Magic Cup TID   3. Education on high protein foods provided to pt. Encourage intake.     Future/Additional Recommendations:  -Encourage PO intake, supplements, high protein foods. Assist with placing meal orders if needed.  -Monitoring wt trends, fluid status, labs, and clinical POC  -Consider restarting dandy cts if indicated pending PO intake trends with FT removed today     EVALUATION OF THE PROGRESS TOWARD GOALS   Diet: Level 6: Soft & Bite-Sized Dysphagia Diet, level 0 thin liquids     PO Intake: Variable intakes per food record review; 0-100% of meals consumed (recently %). Has ONS order in place, Magic Cup TID with meals. Pt likes the Magic Cup supplements. Not interested in adding any other ONS or snacks in-between meals at this time. Highlighted high protein foods on his menu per request, RD encouraging PO intake with removal of FT today per provider.     Calorie counts:   12/23     Total Kcals: 155       Total Protein: 0g   12/24     Total Kcals: 0           Total Protein: 0g   12/25     Total Kcals: 218       Total Protein: 7g   12/26 - Missing data  12/27     Total Kcals: 390       Total Protein: 29g  *=190 Kcals (11% of energy goal) and 9 gm pro (11% of protein goal)    Nutrition Support: Novasource Renal @ 45 ml/hr (540 ml) + Prosource (1 pkt QID) + Banatrol (1 pkt/day) provides 1240 kcal (21 kcal/kg), 93 g pro (1.6 g/kg), 99 g CHO, 387 ml free water, and 4 g fiber daily. This meets 70% energy needs and 100% protein needs. Dosing wt used 58 kg (actual wt 12/21). FWF of 150 mL Q2hrs per provider, since 12/23.  LOV- 2/7/2019     - 3/22/2021    No appointment scheduled     EN Intake: Average 7-day TF intakes: 616 mL formula, 3 packets Prosource =  1352 Kcals (23 Kcal/kg), and 89 g pro (1.5 g/kg). This is meeting 77% of low-end est Kcal needs, and 100% of low-end est protein needs.       NEW FINDINGS   General:    Tx from ICU to IMC on 12/23.      Nutrition/GI:    FEES completed 12/22 with SLP rec diet adv to IDDSI level 2 mildly thick/full liquid diet. RD ordered ONS once able. Diet has since been advanced to soft-and-bite-sized as of today.    Large bore NGT removed per MD request 12/28. Was making it difficult for pt to swallow, per RN report. PO intakes recently starting to improve, per I/O flowsheet; however, not reflected per dandy ct data. Will monitor trends.    Stooling 2-5 times per day. Monitor trends.     Weights:    Weight up 4.5 kg over past week. Variable trends during admit. Overall is down 20.5 kg from admission but difficult to assess if loss is confounded by fluid changes.    Labs:    Reviewed available labs    Hyperglycemia noted, monitor trends (133-170 recent)    Medications:    Banatrol, 1 pkt daily started 12/21    Folvite, 1 mg/day    Lasix    Medium sliding scale insulin     Thera-Vit-M    Prosource, QID    Thiamine, 100 mg/day    Skin:    WOCN following for PI prevention w/ pt previously on ECMO, last note 12/15. See for detail.     MALNUTRITION  % Intake: No decreased intake noted --EN meeting needs   % Weight Loss: Unable to assess, variable trends, cannot r/o fluid etiology   Subcutaneous Fat Loss: Facial region:  Moderate, Upper arm:  Moderate to severe and Thoracic/intercostal:  Moderate  Muscle Loss: Temporal:  Moderate, Scapular bone:  Moderate to severe, Thoracic region (clavicle, acromium bone, deltoid, trapezius, pectoral):  Moderate to severe, Upper arm (bicep, tricep):  Moderate to severe, Dorsal hand:  Moderate, Upper leg (quadricep, hamstring):  Moderate and Posterior calf:  Moderate  Fluid Accumulation/Edema: None noted  Malnutrition  Diagnosis: Severe malnutrition in the context of acute on chronic illness    Previous Goals   Total avg nutritional intake to meet a minimum of 30 kcal/kg and 1.5 g PRO/kg daily (per dosing wt 58 kg).  Evaluation: Protein goal met, energy goal not met     Previous Nutrition Diagnosis  Inadequate oral intake related to NPO (unsafe swallow) as evidenced by dependent on enteral nutrition support to meet 100% estimated needs.     Evaluation: Improving/no longer applicable     CURRENT NUTRITION DIAGNOSIS  Increased nutrient needs (Kcals, protein) related to need for preservation of LBM in setting of prolonged hospital stay and acute illness as evidenced by FT previously providing 70% energy and 100% protein needs, now removed so reliant on oral diet for sole source of nutrition.       INTERVENTIONS  Implementation  Collaboration with other providers - Bedside RN  Medical food supplement therapy - Continue Magic Cup, no additional modifications per pt request  Modify composition of meals/snacks - Offered high protein snacks between meals; pt declined   Nutrition education for nutrition relationship to health/disease - Counseled on high protein foods to preserve LBM/optimize intakes   -Discontinue TF order/modulars    Goals  Patient to consume % of nutritionally adequate meal trays TID, or the equivalent with supplements/snacks.    Monitoring/Evaluation  Progress toward goals will be monitored and evaluated per protocol.    Jacinto Roque RDN, LD, CNSC  [address] pager: [phone]   [address] Phone: *[phone]

## 2022-07-01 RX ORDER — MONTELUKAST SODIUM 4 MG/1
TABLET, CHEWABLE ORAL
Qty: 90 TABLET | Refills: 1 | Status: SHIPPED | OUTPATIENT
Start: 2022-07-01

## 2022-08-29 RX ORDER — MONTELUKAST SODIUM 4 MG/1
TABLET, CHEWABLE ORAL
Qty: 270 TABLET | Refills: 0 | OUTPATIENT
Start: 2022-08-29

## 2022-09-13 ENCOUNTER — OFFICE VISIT (OUTPATIENT)
Dept: FAMILY MEDICINE CLINIC | Facility: CLINIC | Age: 37
End: 2022-09-13
Payer: COMMERCIAL

## 2022-09-13 ENCOUNTER — HOSPITAL ENCOUNTER (OUTPATIENT)
Dept: GENERAL RADIOLOGY | Age: 37
Discharge: HOME | End: 2022-09-13
Attending: NURSE PRACTITIONER
Payer: COMMERCIAL

## 2022-09-13 VITALS
BODY MASS INDEX: 24.33 KG/M2 | OXYGEN SATURATION: 99 % | DIASTOLIC BLOOD PRESSURE: 88 MMHG | RESPIRATION RATE: 16 BRPM | SYSTOLIC BLOOD PRESSURE: 132 MMHG | HEIGHT: 67 IN | HEART RATE: 67 BPM | WEIGHT: 155 LBS

## 2022-09-13 DIAGNOSIS — Z90.49 STATUS POST CHOLECYSTECTOMY: ICD-10-CM

## 2022-09-13 DIAGNOSIS — M53.3 TAIL BONE PAIN: ICD-10-CM

## 2022-09-13 DIAGNOSIS — Z00.00 ROUTINE GENERAL MEDICAL EXAMINATION AT A HEALTH CARE FACILITY: Primary | ICD-10-CM

## 2022-09-13 DIAGNOSIS — Z00.00 LABORATORY EXAMINATION ORDERED AS PART OF A ROUTINE GENERAL MEDICAL EXAMINATION: ICD-10-CM

## 2022-09-13 PROCEDURE — 3008F BODY MASS INDEX DOCD: CPT | Performed by: NURSE PRACTITIONER

## 2022-09-13 PROCEDURE — 3079F DIAST BP 80-89 MM HG: CPT | Performed by: NURSE PRACTITIONER

## 2022-09-13 PROCEDURE — 99385 PREV VISIT NEW AGE 18-39: CPT | Performed by: NURSE PRACTITIONER

## 2022-09-13 PROCEDURE — 3075F SYST BP GE 130 - 139MM HG: CPT | Performed by: NURSE PRACTITIONER

## 2022-09-13 PROCEDURE — 72220 X-RAY EXAM SACRUM TAILBONE: CPT | Performed by: NURSE PRACTITIONER

## 2022-09-13 RX ORDER — METHYLPREDNISOLONE 4 MG/1
TABLET ORAL
Qty: 21 EACH | Refills: 0 | Status: SHIPPED | OUTPATIENT
Start: 2022-09-13

## 2022-09-13 RX ORDER — DICLOFENAC SODIUM 75 MG/1
75 TABLET, DELAYED RELEASE ORAL 2 TIMES DAILY
Qty: 60 TABLET | Refills: 0 | Status: SHIPPED | OUTPATIENT
Start: 2022-09-13 | End: 2022-10-13

## 2022-09-13 RX ORDER — MONTELUKAST SODIUM 4 MG/1
TABLET, CHEWABLE ORAL
Qty: 270 TABLET | Refills: 0 | Status: SHIPPED | OUTPATIENT
Start: 2022-09-13

## 2022-09-13 RX ORDER — MONTELUKAST SODIUM 4 MG/1
1 TABLET, CHEWABLE ORAL 3 TIMES DAILY
Qty: 90 TABLET | Refills: 1 | Status: SHIPPED | OUTPATIENT
Start: 2022-09-13 | End: 2022-09-13

## 2022-09-13 RX ORDER — CYCLOBENZAPRINE HCL 10 MG
10 TABLET ORAL 3 TIMES DAILY
Qty: 30 TABLET | Refills: 1 | Status: SHIPPED | OUTPATIENT
Start: 2022-09-13 | End: 2022-10-03

## 2022-09-14 ENCOUNTER — LAB ENCOUNTER (OUTPATIENT)
Dept: LAB | Age: 37
End: 2022-09-14
Attending: NURSE PRACTITIONER
Payer: COMMERCIAL

## 2022-09-14 DIAGNOSIS — Z00.00 LABORATORY EXAMINATION ORDERED AS PART OF A ROUTINE GENERAL MEDICAL EXAMINATION: ICD-10-CM

## 2022-09-14 DIAGNOSIS — Z13.0 SCREENING FOR IRON DEFICIENCY ANEMIA: ICD-10-CM

## 2022-09-14 LAB
ALBUMIN SERPL-MCNC: 4.2 G/DL (ref 3.4–5)
ALBUMIN/GLOB SERPL: 1.5 {RATIO} (ref 1–2)
ALP LIVER SERPL-CCNC: 41 U/L
ALT SERPL-CCNC: 18 U/L
ANION GAP SERPL CALC-SCNC: 4 MMOL/L (ref 0–18)
AST SERPL-CCNC: 20 U/L (ref 15–37)
BASOPHILS # BLD AUTO: 0.06 X10(3) UL (ref 0–0.2)
BASOPHILS NFR BLD AUTO: 1 %
BILIRUB SERPL-MCNC: 9.5 MG/DL (ref 0.1–2)
BUN BLD-MCNC: 10 MG/DL (ref 7–18)
CALCIUM BLD-MCNC: 8.9 MG/DL (ref 8.5–10.1)
CHLORIDE SERPL-SCNC: 105 MMOL/L (ref 98–112)
CHOLEST SERPL-MCNC: 90 MG/DL (ref ?–200)
CO2 SERPL-SCNC: 28 MMOL/L (ref 21–32)
CREAT BLD-MCNC: 0.5 MG/DL
EOSINOPHIL # BLD AUTO: 0.07 X10(3) UL (ref 0–0.7)
EOSINOPHIL NFR BLD AUTO: 1.2 %
ERYTHROCYTE [DISTWIDTH] IN BLOOD BY AUTOMATED COUNT: 20.4 %
FASTING PATIENT LIPID ANSWER: YES
FASTING STATUS PATIENT QL REPORTED: YES
GFR SERPLBLD BASED ON 1.73 SQ M-ARVRAT: 125 ML/MIN/1.73M2 (ref 60–?)
GLOBULIN PLAS-MCNC: 2.8 G/DL (ref 2.8–4.4)
GLUCOSE BLD-MCNC: 88 MG/DL (ref 70–99)
HCT VFR BLD AUTO: 30.4 %
HDLC SERPL-MCNC: 65 MG/DL (ref 40–59)
HGB BLD-MCNC: 10 G/DL
IMM GRANULOCYTES # BLD AUTO: 0.05 X10(3) UL (ref 0–1)
IMM GRANULOCYTES NFR BLD: 0.8 %
LDLC SERPL CALC-MCNC: 9 MG/DL (ref ?–100)
LYMPHOCYTES # BLD AUTO: 1.67 X10(3) UL (ref 1–4)
LYMPHOCYTES NFR BLD AUTO: 28.3 %
MCH RBC QN AUTO: 28.9 PG (ref 26–34)
MCHC RBC AUTO-ENTMCNC: 32.9 G/DL (ref 31–37)
MCV RBC AUTO: 87.9 FL
MONOCYTES # BLD AUTO: 0.32 X10(3) UL (ref 0.1–1)
MONOCYTES NFR BLD AUTO: 5.4 %
NEUTROPHILS # BLD AUTO: 3.74 X10 (3) UL (ref 1.5–7.7)
NEUTROPHILS # BLD AUTO: 3.74 X10(3) UL (ref 1.5–7.7)
NEUTROPHILS NFR BLD AUTO: 63.3 %
NONHDLC SERPL-MCNC: 25 MG/DL (ref ?–130)
OSMOLALITY SERPL CALC.SUM OF ELEC: 282 MOSM/KG (ref 275–295)
PLATELET # BLD AUTO: 233 10(3)UL (ref 150–450)
POTASSIUM SERPL-SCNC: 3.8 MMOL/L (ref 3.5–5.1)
PROT SERPL-MCNC: 7 G/DL (ref 6.4–8.2)
RBC # BLD AUTO: 3.46 X10(6)UL
SODIUM SERPL-SCNC: 137 MMOL/L (ref 136–145)
TRIGL SERPL-MCNC: 75 MG/DL (ref 30–149)
TSI SER-ACNC: 1.93 MIU/ML (ref 0.36–3.74)
VIT D+METAB SERPL-MCNC: 29.8 NG/ML (ref 30–100)
VLDLC SERPL CALC-MCNC: 9 MG/DL (ref 0–30)
WBC # BLD AUTO: 5.9 X10(3) UL (ref 4–11)

## 2022-09-14 PROCEDURE — 82728 ASSAY OF FERRITIN: CPT

## 2022-09-14 PROCEDURE — 85025 COMPLETE CBC W/AUTO DIFF WBC: CPT

## 2022-09-14 PROCEDURE — 36415 COLL VENOUS BLD VENIPUNCTURE: CPT

## 2022-09-14 PROCEDURE — 83540 ASSAY OF IRON: CPT

## 2022-09-14 PROCEDURE — 82306 VITAMIN D 25 HYDROXY: CPT

## 2022-09-14 PROCEDURE — 83550 IRON BINDING TEST: CPT

## 2022-09-14 PROCEDURE — 80053 COMPREHEN METABOLIC PANEL: CPT

## 2022-09-14 PROCEDURE — 84443 ASSAY THYROID STIM HORMONE: CPT

## 2022-09-14 PROCEDURE — 80061 LIPID PANEL: CPT

## 2022-09-15 LAB
DEPRECATED HBV CORE AB SER IA-ACNC: 112.4 NG/ML
IRON SATN MFR SERPL: 35 %
IRON SERPL-MCNC: 111 UG/DL
TIBC SERPL-MCNC: 317 UG/DL (ref 240–450)
TRANSFERRIN SERPL-MCNC: 213 MG/DL (ref 200–360)

## 2022-09-16 ENCOUNTER — TELEPHONE (OUTPATIENT)
Dept: FAMILY MEDICINE CLINIC | Facility: CLINIC | Age: 37
End: 2022-09-16

## 2022-09-16 NOTE — TELEPHONE ENCOUNTER
----- Message from KENN Yao sent at 9/15/2022  8:18 AM CDT -----  Labs are good , continue Vitamin D 2000 daily   Added iron studies for anemia

## 2022-09-30 DIAGNOSIS — M53.3 TAIL BONE PAIN: ICD-10-CM

## 2022-10-03 ENCOUNTER — PATIENT MESSAGE (OUTPATIENT)
Dept: FAMILY MEDICINE CLINIC | Facility: CLINIC | Age: 37
End: 2022-10-03

## 2022-10-03 DIAGNOSIS — M53.3 TAIL BONE PAIN: ICD-10-CM

## 2022-10-03 DIAGNOSIS — Z90.49 STATUS POST CHOLECYSTECTOMY: Primary | ICD-10-CM

## 2022-10-03 RX ORDER — METHYLPREDNISOLONE 4 MG/1
TABLET ORAL
Qty: 21 EACH | Refills: 0 | Status: SHIPPED | OUTPATIENT
Start: 2022-10-03

## 2022-10-03 RX ORDER — METHYLPREDNISOLONE 4 MG/1
TABLET ORAL
Qty: 21 EACH | Refills: 0 | OUTPATIENT
Start: 2022-10-03

## 2022-10-03 NOTE — TELEPHONE ENCOUNTER
Last filled - 9/13/2022      Is patient having any issues? Or was this an auto refill request from pharmacy?

## 2022-10-03 NOTE — TELEPHONE ENCOUNTER
Pt is asking if she can get another Medrol pack for her back pain. LOV 9/13 & Rx given for Medrol, diclofenac & Flexeril. Pt is taking Diclofenac & it is not helping as much as steroids did. Please advise.

## 2022-10-13 DIAGNOSIS — M53.3 TAIL BONE PAIN: ICD-10-CM

## 2022-10-13 RX ORDER — DICLOFENAC SODIUM 75 MG/1
TABLET, DELAYED RELEASE ORAL
Qty: 60 TABLET | Refills: 0 | Status: SHIPPED | OUTPATIENT
Start: 2022-10-13

## 2023-03-08 DIAGNOSIS — Z90.49 STATUS POST CHOLECYSTECTOMY: ICD-10-CM

## 2023-03-08 RX ORDER — MONTELUKAST SODIUM 4 MG/1
TABLET, CHEWABLE ORAL
Qty: 270 TABLET | Refills: 0 | Status: SHIPPED | OUTPATIENT
Start: 2023-03-08

## 2023-04-17 ENCOUNTER — PATIENT MESSAGE (OUTPATIENT)
Dept: FAMILY MEDICINE CLINIC | Facility: CLINIC | Age: 38
End: 2023-04-17

## 2023-04-19 ENCOUNTER — OFFICE VISIT (OUTPATIENT)
Dept: FAMILY MEDICINE CLINIC | Facility: CLINIC | Age: 38
End: 2023-04-19
Payer: COMMERCIAL

## 2023-04-19 VITALS
TEMPERATURE: 98 F | HEART RATE: 90 BPM | OXYGEN SATURATION: 100 % | BODY MASS INDEX: 26.21 KG/M2 | RESPIRATION RATE: 21 BRPM | SYSTOLIC BLOOD PRESSURE: 130 MMHG | DIASTOLIC BLOOD PRESSURE: 80 MMHG | HEIGHT: 67 IN | WEIGHT: 167 LBS

## 2023-04-19 DIAGNOSIS — S60.021A CONTUSION OF RIGHT INDEX FINGER WITHOUT DAMAGE TO NAIL, INITIAL ENCOUNTER: Primary | ICD-10-CM

## 2023-04-19 PROCEDURE — 3075F SYST BP GE 130 - 139MM HG: CPT | Performed by: NURSE PRACTITIONER

## 2023-04-19 PROCEDURE — 3008F BODY MASS INDEX DOCD: CPT | Performed by: NURSE PRACTITIONER

## 2023-04-19 PROCEDURE — 3079F DIAST BP 80-89 MM HG: CPT | Performed by: NURSE PRACTITIONER

## 2023-04-19 PROCEDURE — 99214 OFFICE O/P EST MOD 30 MIN: CPT | Performed by: NURSE PRACTITIONER

## 2023-04-19 RX ORDER — ESCITALOPRAM OXALATE 10 MG/1
TABLET ORAL
COMMUNITY
Start: 2023-02-01

## 2023-04-20 ENCOUNTER — HOSPITAL ENCOUNTER (OUTPATIENT)
Dept: GENERAL RADIOLOGY | Age: 38
Discharge: HOME | End: 2023-04-20
Attending: NURSE PRACTITIONER
Payer: COMMERCIAL

## 2023-04-20 DIAGNOSIS — S60.021A CONTUSION OF RIGHT INDEX FINGER WITHOUT DAMAGE TO NAIL, INITIAL ENCOUNTER: ICD-10-CM

## 2023-04-20 PROCEDURE — 73140 X-RAY EXAM OF FINGER(S): CPT | Performed by: NURSE PRACTITIONER

## 2023-06-09 DIAGNOSIS — Z90.49 STATUS POST CHOLECYSTECTOMY: ICD-10-CM

## 2023-06-09 RX ORDER — MONTELUKAST SODIUM 4 MG/1
TABLET, CHEWABLE ORAL
Qty: 270 TABLET | Refills: 0 | Status: SHIPPED | OUTPATIENT
Start: 2023-06-09

## 2023-09-13 DIAGNOSIS — Z90.49 STATUS POST CHOLECYSTECTOMY: ICD-10-CM

## 2023-09-13 RX ORDER — MONTELUKAST SODIUM 4 MG/1
1 TABLET, CHEWABLE ORAL 3 TIMES DAILY
Qty: 270 TABLET | Refills: 0 | Status: SHIPPED | OUTPATIENT
Start: 2023-09-13

## 2023-12-23 DIAGNOSIS — Z90.49 STATUS POST CHOLECYSTECTOMY: ICD-10-CM

## 2023-12-26 RX ORDER — MONTELUKAST SODIUM 4 MG/1
1 TABLET, CHEWABLE ORAL 3 TIMES DAILY
Qty: 270 TABLET | Refills: 0 | Status: SHIPPED | OUTPATIENT
Start: 2023-12-26

## 2023-12-26 NOTE — TELEPHONE ENCOUNTER
Medication(s) to Refill:   Requested Prescriptions     Pending Prescriptions Disp Refills    COLESTIPOL 1 g Oral Tab [Pharmacy Med Name: COLESTIPOL 1GM TABLETS] 270 tablet 0     Sig: TAKE 1 TABLET(1 GRAM) BY MOUTH THREE TIMES DAILY         Reason for Medication Refill being sent to Provider / Reason Protocol Failed:  [] 90 day refill has already been granted  [] Blood Pressure out of range  [] Labs Abnormal/over due  [] Medication not previously prescribed by Provider  [] Non-Protocol Medication  [] Controlled Substance   [] Due for appointment- no future appointment scheduled  [] No Follow up specified      Last Time Medication was Filled:  9/13/23      Last Office Visit with PCP: 4/19/23    When Patient was Due Back to the Office:    (from when PCP last addressed condition)    Future Appointments:  Future Appointments   Date Time Provider Yasmin Watersisti   1/17/2024  9:00 AM Mary Burton MD [facility] [place]         Last Blood Pressures:  BP Readings from Last 2 Encounters:   04/19/23 130/80   09/13/22 132/88       Action taken:  [] Refill approved per protocol  [] Routing to provider for approval

## 2024-01-17 ENCOUNTER — OFFICE VISIT (OUTPATIENT)
Dept: FAMILY MEDICINE CLINIC | Facility: CLINIC | Age: 39
End: 2024-01-17
Payer: COMMERCIAL

## 2024-01-17 ENCOUNTER — LAB ENCOUNTER (OUTPATIENT)
Dept: LAB | Age: 39
End: 2024-01-17
Attending: FAMILY MEDICINE
Payer: COMMERCIAL

## 2024-01-17 VITALS
DIASTOLIC BLOOD PRESSURE: 72 MMHG | BODY MASS INDEX: 27.31 KG/M2 | HEART RATE: 75 BPM | RESPIRATION RATE: 13 BRPM | SYSTOLIC BLOOD PRESSURE: 120 MMHG | OXYGEN SATURATION: 100 % | HEIGHT: 67 IN | WEIGHT: 174 LBS

## 2024-01-17 DIAGNOSIS — Z00.00 LABORATORY EXAMINATION ORDERED AS PART OF A ROUTINE GENERAL MEDICAL EXAMINATION: ICD-10-CM

## 2024-01-17 DIAGNOSIS — F41.9 ANXIETY: ICD-10-CM

## 2024-01-17 DIAGNOSIS — Z12.4 SCREENING FOR CERVICAL CANCER: ICD-10-CM

## 2024-01-17 DIAGNOSIS — Z00.00 ROUTINE GENERAL MEDICAL EXAMINATION AT A HEALTH CARE FACILITY: Primary | ICD-10-CM

## 2024-01-17 PROBLEM — R10.84 GENERALIZED ABDOMINAL PAIN: Status: RESOLVED | Noted: 2019-01-25 | Resolved: 2024-01-17

## 2024-01-17 LAB
ALBUMIN SERPL-MCNC: 4.2 G/DL (ref 3.4–5)
ALBUMIN/GLOB SERPL: 1.3 {RATIO} (ref 1–2)
ALP LIVER SERPL-CCNC: 44 U/L
ALT SERPL-CCNC: 23 U/L
ANION GAP SERPL CALC-SCNC: 3 MMOL/L (ref 0–18)
AST SERPL-CCNC: 19 U/L (ref 15–37)
BASOPHILS # BLD AUTO: 0.04 X10(3) UL (ref 0–0.2)
BASOPHILS NFR BLD AUTO: 0.7 %
BILIRUB SERPL-MCNC: 5.4 MG/DL (ref 0.1–2)
BUN BLD-MCNC: 13 MG/DL (ref 9–23)
CALCIUM BLD-MCNC: 8.7 MG/DL (ref 8.5–10.1)
CHLORIDE SERPL-SCNC: 107 MMOL/L (ref 98–112)
CHOLEST SERPL-MCNC: 110 MG/DL (ref ?–200)
CO2 SERPL-SCNC: 29 MMOL/L (ref 21–32)
CREAT BLD-MCNC: 0.64 MG/DL
EGFRCR SERPLBLD CKD-EPI 2021: 117 ML/MIN/1.73M2 (ref 60–?)
EOSINOPHIL # BLD AUTO: 0.08 X10(3) UL (ref 0–0.7)
EOSINOPHIL NFR BLD AUTO: 1.5 %
ERYTHROCYTE [DISTWIDTH] IN BLOOD BY AUTOMATED COUNT: 21.1 %
FASTING PATIENT LIPID ANSWER: YES
FASTING STATUS PATIENT QL REPORTED: YES
GLOBULIN PLAS-MCNC: 3.2 G/DL (ref 2.8–4.4)
GLUCOSE BLD-MCNC: 95 MG/DL (ref 70–99)
HCT VFR BLD AUTO: 31.8 %
HDLC SERPL-MCNC: 62 MG/DL (ref 40–59)
HGB BLD-MCNC: 10.3 G/DL
IMM GRANULOCYTES # BLD AUTO: 0.03 X10(3) UL (ref 0–1)
IMM GRANULOCYTES NFR BLD: 0.6 %
LDLC SERPL CALC-MCNC: 36 MG/DL (ref ?–100)
LYMPHOCYTES # BLD AUTO: 1.42 X10(3) UL (ref 1–4)
LYMPHOCYTES NFR BLD AUTO: 26.3 %
MCH RBC QN AUTO: 28 PG (ref 26–34)
MCHC RBC AUTO-ENTMCNC: 32.4 G/DL (ref 31–37)
MCV RBC AUTO: 86.4 FL
MONOCYTES # BLD AUTO: 0.35 X10(3) UL (ref 0.1–1)
MONOCYTES NFR BLD AUTO: 6.5 %
NEUTROPHILS # BLD AUTO: 3.48 X10 (3) UL (ref 1.5–7.7)
NEUTROPHILS # BLD AUTO: 3.48 X10(3) UL (ref 1.5–7.7)
NEUTROPHILS NFR BLD AUTO: 64.4 %
NONHDLC SERPL-MCNC: 48 MG/DL (ref ?–130)
OSMOLALITY SERPL CALC.SUM OF ELEC: 288 MOSM/KG (ref 275–295)
PLATELET # BLD AUTO: 191 10(3)UL (ref 150–450)
POTASSIUM SERPL-SCNC: 3.8 MMOL/L (ref 3.5–5.1)
PROT SERPL-MCNC: 7.4 G/DL (ref 6.4–8.2)
RBC # BLD AUTO: 3.68 X10(6)UL
SODIUM SERPL-SCNC: 139 MMOL/L (ref 136–145)
TRIGL SERPL-MCNC: 50 MG/DL (ref 30–149)
TSI SER-ACNC: 1.91 MIU/ML (ref 0.36–3.74)
VIT D+METAB SERPL-MCNC: 21.3 NG/ML (ref 30–100)
VLDLC SERPL CALC-MCNC: 7 MG/DL (ref 0–30)
WBC # BLD AUTO: 5.4 X10(3) UL (ref 4–11)

## 2024-01-17 PROCEDURE — 84443 ASSAY THYROID STIM HORMONE: CPT

## 2024-01-17 PROCEDURE — 3074F SYST BP LT 130 MM HG: CPT | Performed by: FAMILY MEDICINE

## 2024-01-17 PROCEDURE — 80061 LIPID PANEL: CPT

## 2024-01-17 PROCEDURE — 82306 VITAMIN D 25 HYDROXY: CPT

## 2024-01-17 PROCEDURE — 80053 COMPREHEN METABOLIC PANEL: CPT

## 2024-01-17 PROCEDURE — 3008F BODY MASS INDEX DOCD: CPT | Performed by: FAMILY MEDICINE

## 2024-01-17 PROCEDURE — 99395 PREV VISIT EST AGE 18-39: CPT | Performed by: FAMILY MEDICINE

## 2024-01-17 PROCEDURE — 85025 COMPLETE CBC W/AUTO DIFF WBC: CPT

## 2024-01-17 PROCEDURE — 3078F DIAST BP <80 MM HG: CPT | Performed by: FAMILY MEDICINE

## 2024-01-17 RX ORDER — ESCITALOPRAM OXALATE 10 MG/1
10 TABLET ORAL DAILY
Qty: 90 TABLET | Refills: 3 | Status: SHIPPED | OUTPATIENT
Start: 2024-01-17

## 2024-01-17 NOTE — PROGRESS NOTES
Jyotsna Askew is a 38 year old female who presents for a complete physical exam, no gyn.  HPI:     Chief Complaint   Patient presents with    Well Adult     Annual Physical        Patient feels well, dental visit up to date, no hearing problem.  Vaccinations up to date.  H/o anxiety, doing great on Lexapro.  Very active physically, walking.    Wt Readings from Last 3 Encounters:   24 174 lb (78.9 kg)   23 167 lb (75.8 kg)   22 155 lb (70.3 kg)      BP Readings from Last 3 Encounters:   24 120/72   23 130/80   22 132/88     Patient's last menstrual period was 2023 (approximate).         Current Outpatient Medications   Medication Sig Dispense Refill    escitalopram (LEXAPRO) 10 MG Oral Tab Take 1 tablet (10 mg total) by mouth daily. 90 tablet 3    colestipol 1 g Oral Tab Take 1 tablet (1 g total) by mouth 3 (three) times daily. 270 tablet 0      Past Medical History:   Diagnosis Date    Allergic rhinitis     Anxiety     BV (bacterial vaginosis)     Chlamydia     Chronic cholecystitis     Hereditary spherocytosis (HCC)     Pregnancy-induced hypertension     pregnancy related    Spherocytosis, hereditary (HCC)     Spherocytosis, hereditary (HCC)       Past Surgical History:   Procedure Laterality Date    CHOLECYSTECTOMY  2016    Laparoscopic cholecystectomy with intraoperative cholangiogram    LAPAROSCOPIC CHOLECYSTECTOMY N/A 2016    Procedure: LAPAROSCOPIC CHOLECYSTECTOMY;  Surgeon: Gopal Ponce MD;  Location:  MAIN OR    [facility]  12/29/11    10/11/2017      No family history on file.   Social History     Socioeconomic History    Marital status:    Tobacco Use    Smoking status: Former     Packs/day: 0.25     Years: 5.00     Additional pack years: 0.00     Total pack years: 1.25     Types: Cigarettes     Quit date: 10/21/2016     Years since quittin.2    Smokeless tobacco: Never   Vaping Use    Vaping Use: Never used   Substance and Sexual  Activity    Alcohol use: Yes     Comment: one or 2 per month    Drug use: No    Sexual activity: Yes     Partners: Male     Comment: no contraception   Other Topics Concern    Caffeine Concern No    Exercise No    Seat Belt No    Special Diet No    Stress Concern No    Weight Concern No   Social History Narrative    ** Merged History Encounter **             REVIEW OF SYSTEMS:   GENERAL HEALTH: feels well, no fatigue.  SKIN: denies any unusual skin lesions or rashes  EYES: no visual complaints or deficits  HEENT: denies nasal congestion, sinus pain or sore throat; hearing loss negative   RESPIRATORY: denies shortness of breath, wheezing or cough   CARDIOVASCULAR: denies chest pain, SOB, edema,orthopnea, no palpitations   GI: denies nausea, vomiting, constipation, diarrhea; no rectal bleeding; no heartburn  GENITAL/: no dysuria, urgency or frequency  MUSCULOSKELETAL: no joint complaints upper or lower extremities  NEURO: no sensory or motor complaint  HEMATOLOGY: denies hx anemia; denies bruising or excessive bleeding  ENDOCRINE: denies excessive thirst or urination; denies unexpected wt gain or wt loss  ALLERGY/IMM.: denies food or seasonal allergies  PSYCH: no symptoms of depression or anxiety      EXAM:   /72   Pulse 75   Resp 13   Ht 5' 7\" (1.702 m)   Wt 174 lb (78.9 kg)   LMP 12/27/2023 (Approximate)   SpO2 100%   BMI 27.25 kg/m²      General: WD/WN in no acute distress.   HEENT: PERRLA and EOMI.  OP moist no lesions.  Neck is supple, with no cervical LAD or thyroid abnormalities. No carotid bruits.    Lungs: are clear to auscultation bilaterally, with no wheeze, rhonchi, or rales.   Heart: is RRR.  S1, S2, with no murmurs,clicks, gallops  Abdomen: is soft,NBS, NT/ND with no HSM.  No rebound or guarding. No CVA tenderness, no hernias.  Neuro: Cranial nerves II-XII normal,no focal abnormalities, and reflexes coordination and gait normal and symmetric.Sensation intact.  Extremities: are symmetric  with no cyanosis, clubbing, or edema.  MS: Normal muscles tones, no joints abnormalities.  SKIN: Normal color, turgor, no lesions, rashes or wounds.  PSYCH: Normal affect and mood.          ASSESSMENT AND PLAN:     Jyotsna Askew is a 38 year old female who presents for a complete physical exam.   Pt's was recommended low fat diet and aerobic exercise 30 minutes three times weekly.   Pap and gyn exam: pt has appt next month.      Laboratory examination ordered as part of a routine general medical examination  -     CBC With Differential With Platelet; Future  -     Comp Metabolic Panel (14); Future  -     Lipid Panel; Future  -     TSH W Reflex To Free T4; Future  -     Vitamin D; Future    Anxiety  -     escitalopram (LEXAPRO) 10 MG Oral Tab; Take 1 tablet (10 mg total) by mouth daily.       The patient indicates understanding of these issues and agrees to the plan.  The patient is asked to return for CPX in 1 year.

## 2024-01-18 ENCOUNTER — TELEPHONE (OUTPATIENT)
Dept: FAMILY MEDICINE CLINIC | Facility: CLINIC | Age: 39
End: 2024-01-18

## 2024-01-18 RX ORDER — ERGOCALCIFEROL 1.25 MG/1
50000 CAPSULE ORAL WEEKLY
Qty: 12 CAPSULE | Refills: 0 | Status: SHIPPED | OUTPATIENT
Start: 2024-01-18

## 2024-01-18 NOTE — TELEPHONE ENCOUNTER
----- Message from Hoa Don MD sent at 1/18/2024  9:44 AM CST -----  Vitamin D level is low. Please send Rx for 50,000U per week for 3 months.

## 2024-03-30 DIAGNOSIS — Z90.49 STATUS POST CHOLECYSTECTOMY: ICD-10-CM

## 2024-04-01 RX ORDER — MONTELUKAST SODIUM 4 MG/1
1 TABLET, CHEWABLE ORAL 3 TIMES DAILY
Qty: 270 TABLET | Refills: 0 | Status: SHIPPED | OUTPATIENT
Start: 2024-04-01 | End: 2024-05-13

## 2024-04-02 ENCOUNTER — OFFICE VISIT (OUTPATIENT)
Dept: FAMILY MEDICINE CLINIC | Facility: CLINIC | Age: 39
End: 2024-04-02
Payer: COMMERCIAL

## 2024-04-02 VITALS
SYSTOLIC BLOOD PRESSURE: 142 MMHG | WEIGHT: 178 LBS | RESPIRATION RATE: 16 BRPM | BODY MASS INDEX: 27.94 KG/M2 | DIASTOLIC BLOOD PRESSURE: 80 MMHG | HEIGHT: 67 IN

## 2024-04-02 DIAGNOSIS — R10.32 LEFT INGUINAL PAIN: Primary | ICD-10-CM

## 2024-04-02 DIAGNOSIS — R10.2 PELVIC PAIN: ICD-10-CM

## 2024-04-02 DIAGNOSIS — R10.31 RIGHT INGUINAL PAIN: ICD-10-CM

## 2024-04-02 PROCEDURE — 3079F DIAST BP 80-89 MM HG: CPT | Performed by: FAMILY MEDICINE

## 2024-04-02 PROCEDURE — 99214 OFFICE O/P EST MOD 30 MIN: CPT | Performed by: FAMILY MEDICINE

## 2024-04-02 PROCEDURE — 3077F SYST BP >= 140 MM HG: CPT | Performed by: FAMILY MEDICINE

## 2024-04-02 PROCEDURE — 3008F BODY MASS INDEX DOCD: CPT | Performed by: FAMILY MEDICINE

## 2024-04-02 RX ORDER — ERGOCALCIFEROL 1.25 MG/1
50000 CAPSULE ORAL WEEKLY
Qty: 12 CAPSULE | Refills: 0 | Status: SHIPPED | OUTPATIENT
Start: 2024-04-02

## 2024-04-02 NOTE — PROGRESS NOTES
Jyotsna Askew is a 39 year old female who presents for pelvic pain.      HPI:  The patient complaints of pelvic pain, deep, low on the L  and R side.  Moderate, with radiation to the groins on the same sides.  No bloating or gaining weight. Last months,intermittent, last few hours at the time worsening.  No back pain, SOB,no abnormal menstruation no hot flashes.  No h/o abnormal Paps. Last pelvic US 1/2019 was normal.Pt has a farm and lifts lots of things.  H/o ovarian cysts:none. No C sections.    Wt Readings from Last 6 Encounters:   04/02/24 178 lb (80.7 kg)   01/17/24 174 lb (78.9 kg)   04/19/23 167 lb (75.8 kg)   09/13/22 155 lb (70.3 kg)   11/20/19 148 lb 12.8 oz (67.5 kg)   09/12/19 147 lb (66.7 kg)     Body mass index is 27.88 kg/m².     Cholesterol, Total (mg/dL)   Date Value   01/17/2024 110   09/14/2022 90   01/25/2019 87     HDL Cholesterol (mg/dL)   Date Value   01/17/2024 62 (H)   09/14/2022 65 (H)   01/25/2019 60 (H)     LDL Cholesterol (mg/dL)   Date Value   01/17/2024 36   09/14/2022 9   01/25/2019 16     AST (U/L)   Date Value   01/17/2024 19   09/14/2022 20   11/20/2019 13 (L)   07/16/2011 15     ALT (U/L)   Date Value   01/17/2024 23   09/14/2022 18   11/20/2019 12 (L)   07/16/2011 16      Current Outpatient Medications   Medication Sig Dispense Refill    colestipol 1 g Oral Tab Take 1 tablet (1 g total) by mouth 3 (three) times daily. 270 tablet 0    ergocalciferol 1.25 MG (28305 UT) Oral Cap Take 1 capsule (50,000 Units total) by mouth once a week. for 12 weeks 12 capsule 0    escitalopram (LEXAPRO) 10 MG Oral Tab Take 1 tablet (10 mg total) by mouth daily. 90 tablet 3      Past Medical History:   Diagnosis Date    Allergic rhinitis     Anxiety     BV (bacterial vaginosis)     Chlamydia     Chronic cholecystitis     Hereditary spherocytosis (HCC)     Pregnancy-induced hypertension (HCC)     pregnancy related    Spherocytosis, hereditary (HCC)     Spherocytosis, hereditary (HCC)        Past Surgical History:   Procedure Laterality Date    CHOLECYSTECTOMY  2016    Laparoscopic cholecystectomy with intraoperative cholangiogram    LAPAROSCOPIC CHOLECYSTECTOMY N/A 2016    Procedure: LAPAROSCOPIC CHOLECYSTECTOMY;  Surgeon: Gopal Ponce MD;  Location:  MAIN OR      12/29/11    10/11/2017      History reviewed. No pertinent family history.   Social History:  Social History     Socioeconomic History    Marital status:    Tobacco Use    Smoking status: Former     Packs/day: 0.25     Years: 5.00     Additional pack years: 0.00     Total pack years: 1.25     Types: Cigarettes     Quit date: 10/21/2016     Years since quittin.4    Smokeless tobacco: Never   Vaping Use    Vaping Use: Never used   Substance and Sexual Activity    Alcohol use: Yes     Comment: one or 2 per month    Drug use: No    Sexual activity: Yes     Partners: Male     Comment: no contraception   Other Topics Concern    Caffeine Concern No    Exercise No    Seat Belt No    Special Diet No    Stress Concern No    Weight Concern No   Social History Narrative    ** Merged History Encounter **              REVIEW OF SYSTEMS:   GENERAL: feels tired, no lethargy, no fever, no chills  SKIN: denies any unusual skin lesions, rash.  LUNGS: denies shortness of breath with exertion  CARDIOVASCULAR: denies chest pain on exertion  GI: as above.No heartburn.No melena, no rectal bleeding.No vomiting.  :no dysuria.  NEURO: denies headaches      EXAM:   /80   Resp 16   Ht 5' 7\" (1.702 m)   Wt 178 lb (80.7 kg)   LMP 2024 (Exact Date)   BMI 27.88 kg/m²   Body mass index is 27.88 kg/m².   GENERAL: well developed, well nourished,in no apparent distress  SKIN: no rashes,no suspicious lesions  HEENT:Mild dry oral mucosa.  EYES:PERRLA, EOMI, sclera not icteric.  NECK: supple,no adenopathy  LUNGS: clear to auscultation  CARDIO: RRR without murmur  GI: good BS's,no masses, HSM or tenderness, Bustillos negative, no  guarding, no Psoas sign. No hernias.  PEVIS: some tenderness on the left side  pelvis.  EXTREMITIES: no edema  NEURO: DTR 2+bilaterally upper and lower extremities.    ASSESSMENT AND PLAN:   Jyotsna Askew is a 39 year old female who presents for:   Jyotsna was seen today for pelvic pain.    Diagnoses and all orders for this visit:    Left inguinal pain  -     US GROIN BILATERAL (CPT=76882-50); Future    Right inguinal pain  -     US GROIN BILATERAL (CPT=76882-50); Future    Pelvic pain  -     US PELVIS (TRANSABDOMINAL AND TRANSVAGINAL) (CPT=76856/37422); Future      F/u in one month.

## 2024-04-15 ENCOUNTER — HOSPITAL ENCOUNTER (OUTPATIENT)
Dept: ULTRASOUND IMAGING | Age: 39
Discharge: HOME | End: 2024-04-15
Attending: FAMILY MEDICINE
Payer: COMMERCIAL

## 2024-04-15 ENCOUNTER — PATIENT MESSAGE (OUTPATIENT)
Dept: FAMILY MEDICINE CLINIC | Facility: CLINIC | Age: 39
End: 2024-04-15

## 2024-04-15 DIAGNOSIS — R10.2 PELVIC PAIN: ICD-10-CM

## 2024-04-15 DIAGNOSIS — R10.32 LEFT INGUINAL PAIN: ICD-10-CM

## 2024-04-15 DIAGNOSIS — R10.31 RIGHT INGUINAL PAIN: ICD-10-CM

## 2024-04-15 PROCEDURE — 76830 TRANSVAGINAL US NON-OB: CPT | Performed by: FAMILY MEDICINE

## 2024-04-15 PROCEDURE — 76882 US LMTD JT/FCL EVL NVASC XTR: CPT | Performed by: FAMILY MEDICINE

## 2024-04-15 PROCEDURE — 76856 US EXAM PELVIC COMPLETE: CPT | Performed by: FAMILY MEDICINE

## 2024-04-15 NOTE — TELEPHONE ENCOUNTER
Patient still experiencing the pelvic/inguinal pain.   Completed pelvic and groin US which were normal.

## 2024-04-15 NOTE — TELEPHONE ENCOUNTER
From: Jyotsna Askew  To: Hoa Don  Sent: 4/15/2024 2:18 PM CDT  Subject: Pelvic Pain    Good Afternoon. I went ahead and did the ultrasounds today. I was trying to time it where it would be after my period, since that is when the pain normally arrives. Pain happened again 4/13. Period was from 4/5 - 4/9. Stabbing like pain. Began early afternoon & continued all night.

## 2024-04-16 NOTE — TELEPHONE ENCOUNTER
I recommend:  Dr. Sanam Grant      Obstetrics & Gynecology:      [facility]    [address] [facility]  (Opened 6/15/22)   [place]  [address]   PH:  [phone]        Or        Dr. Samson Harris NP    Obstetrics & Gynecology  [address] [address]  [phone] [address]  [address]  [place] [id number]  Phone: [phone]    [address] [place] [address]  Phone: [phone]

## 2024-05-13 DIAGNOSIS — Z90.49 STATUS POST CHOLECYSTECTOMY: ICD-10-CM

## 2024-05-13 RX ORDER — MONTELUKAST SODIUM 4 MG/1
1 TABLET, CHEWABLE ORAL 3 TIMES DAILY
Qty: 270 TABLET | Refills: 1 | Status: SHIPPED | OUTPATIENT
Start: 2024-05-13

## 2024-05-14 ENCOUNTER — PATIENT MESSAGE (OUTPATIENT)
Dept: FAMILY MEDICINE CLINIC | Facility: CLINIC | Age: 39
End: 2024-05-14

## 2024-05-15 RX ORDER — ESCITALOPRAM OXALATE 20 MG/1
20 TABLET ORAL DAILY
Qty: 30 TABLET | Refills: 2 | Status: SHIPPED | OUTPATIENT
Start: 2024-05-15

## 2024-05-15 NOTE — TELEPHONE ENCOUNTER
From: Jyotsna Askew  To: Hoa Don  Sent: 5/14/2024 7:35 PM CDT  Subject: Question on Medication    Good Evening! I was wondering if I could go up a dose for my Lexapro. I’m on 10mg currently. Could I try the next one up?

## 2024-06-19 RX ORDER — ERGOCALCIFEROL 1.25 MG/1
50000 CAPSULE ORAL WEEKLY
Qty: 12 CAPSULE | Refills: 0 | OUTPATIENT
Start: 2024-06-19

## 2024-06-19 NOTE — TELEPHONE ENCOUNTER
1/18/2024-   Hoa Don MD  1/18/2024  9:44 AM CST       Vitamin D level is low. Please send Rx for 50,000U per week for 3 months.

## 2024-07-25 ENCOUNTER — PATIENT MESSAGE (OUTPATIENT)
Dept: FAMILY MEDICINE CLINIC | Facility: CLINIC | Age: 39
End: 2024-07-25

## 2024-07-26 RX ORDER — ESCITALOPRAM OXALATE 20 MG/1
20 TABLET ORAL DAILY
Qty: 90 TABLET | Refills: 0 | Status: SHIPPED | OUTPATIENT
Start: 2024-07-26

## 2024-07-26 NOTE — TELEPHONE ENCOUNTER
From: Jyotsna Askew  To: Hoa Don  Sent: 7/25/2024 10:43 AM CDT  Subject: Prescription Question    Good Morning,    I am trying to refill my 20mg lexapro. It has been waiting on insurance approval so I called [employer] and they said it is because insurance would like it as a 90 supply, not a 30. Would Dr. DUMONT be able to approve that? I believe [employer] said they may send a notification to your office as well.    Thanks!

## 2024-09-16 RX ORDER — ERGOCALCIFEROL 1.25 MG/1
50000 CAPSULE, LIQUID FILLED ORAL WEEKLY
Qty: 12 CAPSULE | Refills: 0 | Status: SHIPPED | OUTPATIENT
Start: 2024-09-16

## 2024-09-17 ENCOUNTER — TELEPHONE (OUTPATIENT)
Dept: INTERNAL MEDICINE CLINIC | Facility: CLINIC | Age: 39
End: 2024-09-17

## 2024-09-17 NOTE — TELEPHONE ENCOUNTER
Below message sent in MC. MC response sent to pt advising to call office to discuss sxs, MC read but no call from pt.     Left message to call back 9/17.

## 2024-09-17 NOTE — TELEPHONE ENCOUNTER
From: Jyotsna Askew  To: Hoa Don  Sent: 9/15/2024 12:12 PM CDT  Subject: Vertigo    Good Afternoon! I began having vertigo symptoms again. I was wondering if I could get a prescription again of Meclizine? Or do I need to schedule an appointment to come in?    Also, I know P.T. helped a lot last time. Do I need a referral for that?    Thanks!

## 2024-11-04 RX ORDER — ESCITALOPRAM OXALATE 20 MG/1
20 TABLET ORAL DAILY
Qty: 30 TABLET | Refills: 0 | Status: SHIPPED | OUTPATIENT
Start: 2024-11-04

## 2024-12-13 RX ORDER — ERGOCALCIFEROL 1.25 MG/1
50000 CAPSULE, LIQUID FILLED ORAL WEEKLY
Qty: 12 CAPSULE | Refills: 0 | Status: SHIPPED | OUTPATIENT
Start: 2024-12-13

## 2025-01-14 ENCOUNTER — OFFICE VISIT (OUTPATIENT)
Dept: FAMILY MEDICINE CLINIC | Facility: CLINIC | Age: 40
End: 2025-01-14
Payer: COMMERCIAL

## 2025-01-14 VITALS
BODY MASS INDEX: 27.78 KG/M2 | HEART RATE: 73 BPM | HEIGHT: 67 IN | SYSTOLIC BLOOD PRESSURE: 118 MMHG | WEIGHT: 177 LBS | OXYGEN SATURATION: 100 % | DIASTOLIC BLOOD PRESSURE: 78 MMHG

## 2025-01-14 DIAGNOSIS — Z00.00 ROUTINE GENERAL MEDICAL EXAMINATION AT A HEALTH CARE FACILITY: Primary | ICD-10-CM

## 2025-01-14 PROCEDURE — 3074F SYST BP LT 130 MM HG: CPT | Performed by: FAMILY MEDICINE

## 2025-01-14 PROCEDURE — 3078F DIAST BP <80 MM HG: CPT | Performed by: FAMILY MEDICINE

## 2025-01-14 PROCEDURE — 99395 PREV VISIT EST AGE 18-39: CPT | Performed by: FAMILY MEDICINE

## 2025-01-14 PROCEDURE — 3008F BODY MASS INDEX DOCD: CPT | Performed by: FAMILY MEDICINE

## 2025-01-14 RX ORDER — ESCITALOPRAM OXALATE 20 MG/1
20 TABLET ORAL DAILY
Qty: 90 TABLET | Refills: 4 | Status: SHIPPED | OUTPATIENT
Start: 2025-01-14

## 2025-01-14 RX ORDER — ALBUTEROL SULFATE 90 UG/1
2 INHALANT RESPIRATORY (INHALATION) EVERY 4 HOURS PRN
COMMUNITY
Start: 2024-12-28

## 2025-01-14 NOTE — PROGRESS NOTES
Jyotsna Askew is a 39 year old female who presents for a complete physical exam, no gyn.  HPI:     Chief Complaint   Patient presents with    Follow - Up       Patient feels well, dental visit up to date, no hearing problem.  H/o anxiety, doing great on Lexapro.  Very active physically, walking.    Wt Readings from Last 3 Encounters:   25 177 lb (80.3 kg)   24 178 lb (80.7 kg)   24 174 lb (78.9 kg)      BP Readings from Last 3 Encounters:   25 118/78   24 142/80   24 120/72     Patient's last menstrual period was 2025 (approximate).         Current Outpatient Medications   Medication Sig Dispense Refill    albuterol 108 (90 Base) MCG/ACT Inhalation Aero Soln Inhale 2 puffs into the lungs every 4 (four) hours as needed for Wheezing.      escitalopram 20 MG Oral Tab Take 1 tablet (20 mg total) by mouth daily. 90 tablet 4    ERGOCALCIFEROL 1.25 MG (19388 UT) Oral Cap TAKE 1 CAPSULE BY MOUTH 1 TIME A WEEK 12 capsule 0    colestipol 1 g Oral Tab TAKE 1 TABLET(1 GRAM) BY MOUTH THREE TIMES DAILY 270 tablet 1      Past Medical History:    Allergic rhinitis    Anxiety    BV (bacterial vaginosis)    Chlamydia    Chronic cholecystitis    Hereditary spherocytosis (HCC)    Pregnancy-induced hypertension (HCC)    pregnancy related    Spherocytosis, hereditary (HCC)    Spherocytosis, hereditary (HCC)      Past Surgical History:   Procedure Laterality Date    Cholecystectomy  2016    Laparoscopic cholecystectomy with intraoperative cholangiogram    Laparoscopic cholecystectomy N/A 2016    Procedure: LAPAROSCOPIC CHOLECYSTECTOMY;  Surgeon: Gopal Ponce MD;  Location:  MAIN OR      12/29/11    10/11/2017      No family history on file.   Social History     Socioeconomic History    Marital status:    Tobacco Use    Smoking status: Former     Current packs/day: 0.00     Average packs/day: 0.3 packs/day for 5.0 years (1.3 ttl pk-yrs)     Types: Cigarettes      Start date: 10/21/2011     Quit date: 10/21/2016     Years since quittin.2    Smokeless tobacco: Never   Vaping Use    Vaping status: Never Used   Substance and Sexual Activity    Alcohol use: Yes     Comment: one or 2 per month    Drug use: No    Sexual activity: Yes     Partners: Male     Comment: no contraception   Other Topics Concern    Caffeine Concern No    Exercise No    Seat Belt No    Special Diet No    Stress Concern No    Weight Concern No   Social History Narrative    ** Merged History Encounter **             REVIEW OF SYSTEMS:   GENERAL HEALTH: feels well, no fatigue.  SKIN: denies any unusual skin lesions or rashes  EYES: no visual complaints or deficits  HEENT: denies nasal congestion, sinus pain or sore throat; hearing loss negative   RESPIRATORY: denies shortness of breath, wheezing or cough   CARDIOVASCULAR: denies chest pain, SOB, edema,orthopnea, no palpitations   GI: denies nausea, vomiting, constipation, diarrhea; no rectal bleeding; no heartburn  GENITAL/: no dysuria, urgency or frequency  MUSCULOSKELETAL: no joint complaints upper or lower extremities  NEURO: no sensory or motor complaint  HEMATOLOGY: denies hx anemia; denies bruising or excessive bleeding  ENDOCRINE: denies excessive thirst or urination; denies unexpected wt gain or wt loss  ALLERGY/IMM.: denies food or seasonal allergies  PSYCH: no symptoms of depression or anxiety      EXAM:   /78   Pulse 73   Ht 5' 7\" (1.702 m)   Wt 177 lb (80.3 kg)   LMP 2025 (Approximate)   SpO2 100%   BMI 27.72 kg/m²      General: WD/WN in no acute distress.   HEENT: PERRLA and EOMI.  OP moist no lesions.  Neck is supple, with no cervical LAD or thyroid abnormalities. No carotid bruits.    Lungs: are clear to auscultation bilaterally, with no wheeze, rhonchi, or rales.   Heart: is RRR.  S1, S2, with no murmurs,clicks, gallops  Abdomen: is soft,NBS, NT/ND with no HSM.  No rebound or guarding. No CVA tenderness, no  hernias.  Neuro: Cranial nerves II-XII normal,no focal abnormalities, and reflexes coordination and gait normal and symmetric.Sensation intact.  Extremities: are symmetric with no cyanosis, clubbing, or edema.  MS: Normal muscles tones, no joints abnormalities.  SKIN: Normal color, turgor, no lesions, rashes or wounds.  PSYCH: Normal affect and mood.          ASSESSMENT AND PLAN:     Jyotsna Askew is a 39 year old female who presents for a complete physical exam.   Pt's was recommended low fat diet and aerobic exercise 30 minutes three times weekly.   Pap and gyn exam: pt up to date.      Laboratory examination ordered as part of a routine general medical examination  -     CBC With Differential With Platelet; Future  -     Comp Metabolic Panel (14); Future  -     Lipid Panel; Future  -     TSH W Reflex To Free T4; Future  -     Vitamin D; Future    Anxiety  -     escitalopram (LEXAPRO) 10 MG Oral Tab; Take 1 tablet (10 mg total) by mouth daily.       The patient indicates understanding of these issues and agrees to the plan.  The patient is asked to return for CPX in 1 year.

## (undated) NOTE — MR AVS SNAPSHOT
After Visit Summary   9/11/2019    Lore Gonzalez    MRN: [id number]           Visit Information     Date & Time  9/11/2019 12:00 PM Provider  Avril Givens MD [place] [address], [address] Dept[address]  Phone  [phone] [email or web address]                   DO YOU KNOW WHERE TO GO? Injury & illness are never convenient. If you are dealing with a   non-emergency, consider your options before heading to an ER.          SAME DAY  APPOINTMENTS  Availa

## (undated) NOTE — MR AVS SNAPSHOT
[facility] [address] [place] [address], [address]  [phone]               Thank you for choosing us for your health care visit with Rossana Acosta MD.  We are glad to serve you and happy to provide you with Contact the office if you have diarrhea for more than 3 days. Allergies: Benadryl, Claritin or Zyrtec    Hemorrhoids: Tucks pads, Preparation H, Annusol, Sitz bath or Witch hazel  Eat a high fiber diet and drink plenty of fluids.     Yeast: Monistat 3 or view more details from this visit by going to [email or web address]. If you've recently had a stay at the Hospital you can access your discharge instructions in [facility] by going to Visits < Admission Summaries.  If you've been to the Emergency Depar

## (undated) NOTE — MR AVS SNAPSHOT
[facility], [facility]  [address] [facility], [facility] [address] [place] [address] [phone]               Thank you for choosing us for your health care visit with MELISA Moyer.   We are glad to serve you and happy to provide you with this sum Current Medications          This list is accurate as of: 5/26/17  3:30 PM.  Always use your most recent med list.                folic acid 1 MG Tabs   TK 1 T PO D   Commonly known as:  FOLVITE           Metoclopramide HCl 10 MG Tabs   Take 1 tablet (10 m

## (undated) NOTE — MR AVS SNAPSHOT
After Visit Summary   2017    Radha Cuellar    MRN: [id number]           Visit Information        Provider Department Dept Phone    2017 10:00 AM  [id number]   Outpt [phone]      Your Vitals Were     BP Pulse Ht Wt BMI L

## (undated) NOTE — MR AVS SNAPSHOT
[facility], [address], [facility] [address] [place] [address]  [phone]               Thank you for choosing us for your health care visit with MELISA Fiore.   We are glad to serve you and happy to provide you with this sum Return if symptoms worsen or fail to improve.          Reason for Today's Visit     Other           Medical Issues Discussed Today     Pelvic pain affecting pregnancy in second trimester, antepartum    -  Primary      Instructions and Information about You

## (undated) NOTE — MR AVS SNAPSHOT
[facility] [address] [place] , [facility] [address] [facility]  [phone]               Thank you for choosing us for your health care visit with Yeny Hogan MD.  We are glad to serve you and happy to provide you with t Commonly known as:  REGLAN           PRENATAL 27-0.8 MG Tabs   Take 1 tablet by mouth daily.                    MyChart     Visit MyChart  You can access your MyChart to more actively manage your health care and view more details from this visit by going to

## (undated) NOTE — MR AVS SNAPSHOT
After Visit Summary   2017    Rios Flaherty    MRN: [id number]           Visit Information     Date & Time  2017 10:00 AM Provider  [address]  Dept.  Phone  [phone] [place]     BP Women and lighter weight persons: limit to <= 1 drink* per day. [facility] now offers Video Visits through [address] for adult and pediatric patients.   Video Visits are available Monday - Friday for many common conditions such as allergies, c Life-threatening emergencies needing immediate intervention   at a hospital emergency room.       Average cost  $2,300*   *Cost varies based on your insurance coverage  For more information about hours, locations or appointment options available at [facility],  vi

## (undated) NOTE — MR AVS SNAPSHOT
[address] [facility] [address] [place], [place] [address] [facility]  [phone]               Thank you for choosing us for your health care visit with Darrion Klein MD.  We are glad to serve you and happy to provide you with this authorization numbers or be assured that none are required. You can then schedule your appointment. Failure to obtain required authorization numbers can create reimbursement difficulties for you.         Estimated Date of Delivery: 10/30/17    [facility] MyChart     Visit [facility]  You can access your MyChart to more actively manage your health care and view more details from this visit by going to [email or web address].   If you've recently had a stay at the Hospital you can access your disc

## (undated) NOTE — MR AVS SNAPSHOT
[facility] [address], [facility] [address]  [phone]               Thank you for choosing us for your health care visit with Zari Gonzalez MD.  We are glad to serve you and happy to provide you with this HIV AG AB COMBO    Complete by: Mar 16, 2017 (Approximate)    Assoc Dx:  Prenatal care, subsequent pregnancy in first trimester [Z34.81]           Urine Culture, Routine    Complete by:   Mar 16, 2017 (Approximate)    Assoc Dx:  Prenatal care, subsequent numbers can create reimbursement difficulties for you.         Estimated Date of Delivery: 10/30/17    [facility]  [address], [address] [place] [address]  [place], [address]  [phone]    Consult Type:  MFM Consult ONLY    Ultrasound with Summaries. If you've been to the Emergency Department or your doctor's office, you can view your past visit information in [employer] by going to Visits < Visit Summaries. [employer] questions? Call [phone] for help.   [employer] is NOT to be used for urge

## (undated) NOTE — MR AVS SNAPSHOT
[facility] [address] , [facility] [address] [address] [facility]  [phone]               Thank you for choosing us for your health care visit with Jacinto Reddy MD.  We are glad to serve you and happy to provide you with this docusate sodium 100 MG Caps   Take 1 capsule (100 mg total) by mouth 3 (three) times daily.    Commonly known as:  COLACE           Hyoscyamine Sulfate 0.125 MG Subl   DIS 1 TO 2 TS UNT Q 4 TO 6 H PRF PAIN   Commonly known as:  LEVSIN/SL